# Patient Record
Sex: FEMALE | Race: WHITE
[De-identification: names, ages, dates, MRNs, and addresses within clinical notes are randomized per-mention and may not be internally consistent; named-entity substitution may affect disease eponyms.]

---

## 2017-03-29 ENCOUNTER — HOSPITAL ENCOUNTER (EMERGENCY)
Dept: HOSPITAL 8 - ED | Age: 22
Discharge: HOME | End: 2017-03-29
Payer: COMMERCIAL

## 2017-03-29 VITALS — SYSTOLIC BLOOD PRESSURE: 114 MMHG | DIASTOLIC BLOOD PRESSURE: 67 MMHG

## 2017-03-29 VITALS — BODY MASS INDEX: 25.78 KG/M2 | HEIGHT: 63 IN | WEIGHT: 145.51 LBS

## 2017-03-29 DIAGNOSIS — K21.0: Primary | ICD-10-CM

## 2017-03-29 LAB
AST SERPL-CCNC: 22 U/L (ref 15–37)
BUN SERPL-MCNC: 11 MG/DL (ref 7–18)
HCG UR LOT: (no result)
HCG UR OBC: (no result)
HGB BLD-MCNC: 17.1 G/DL (ref 11.7–16.4)
PATH.CAST-FLAG: (no result)
SPERM-FLAG: (no result)
SRC-FLAG: (no result)
XTAL-FLAG: (no result)
YLC-FLAG: (no result)

## 2017-03-29 PROCEDURE — 96375 TX/PRO/DX INJ NEW DRUG ADDON: CPT

## 2017-03-29 PROCEDURE — 87086 URINE CULTURE/COLONY COUNT: CPT

## 2017-03-29 PROCEDURE — 96374 THER/PROPH/DIAG INJ IV PUSH: CPT

## 2017-03-29 PROCEDURE — 80053 COMPREHEN METABOLIC PANEL: CPT

## 2017-03-29 PROCEDURE — 81001 URINALYSIS AUTO W/SCOPE: CPT

## 2017-03-29 PROCEDURE — 36415 COLL VENOUS BLD VENIPUNCTURE: CPT

## 2017-03-29 PROCEDURE — 85025 COMPLETE CBC W/AUTO DIFF WBC: CPT

## 2017-03-29 PROCEDURE — 96361 HYDRATE IV INFUSION ADD-ON: CPT

## 2017-03-29 PROCEDURE — 99285 EMERGENCY DEPT VISIT HI MDM: CPT

## 2017-03-29 PROCEDURE — 76700 US EXAM ABDOM COMPLETE: CPT

## 2017-03-29 PROCEDURE — 81025 URINE PREGNANCY TEST: CPT

## 2017-03-29 PROCEDURE — S0028 INJECTION, FAMOTIDINE, 20 MG: HCPCS

## 2017-03-29 PROCEDURE — 83690 ASSAY OF LIPASE: CPT

## 2017-12-27 ENCOUNTER — HOSPITAL ENCOUNTER (EMERGENCY)
Dept: HOSPITAL 8 - ED | Age: 22
Discharge: HOME | End: 2017-12-27
Payer: COMMERCIAL

## 2017-12-27 VITALS — BODY MASS INDEX: 25.53 KG/M2 | HEIGHT: 65 IN | WEIGHT: 153.22 LBS

## 2017-12-27 VITALS — DIASTOLIC BLOOD PRESSURE: 56 MMHG | SYSTOLIC BLOOD PRESSURE: 104 MMHG

## 2017-12-27 DIAGNOSIS — K25.3: Primary | ICD-10-CM

## 2017-12-27 DIAGNOSIS — B96.81: ICD-10-CM

## 2017-12-27 LAB
ALBUMIN SERPL-MCNC: 4.1 G/DL (ref 3.4–5)
ALP SERPL-CCNC: 92 U/L (ref 45–117)
ALT SERPL-CCNC: 18 U/L (ref 12–78)
ANION GAP SERPL CALC-SCNC: 7 MMOL/L (ref 5–15)
BASOPHILS # BLD AUTO: 0.02 X10^3/UL (ref 0–0.1)
BASOPHILS NFR BLD AUTO: 0 % (ref 0–1)
BILIRUB SERPL-MCNC: 1.1 MG/DL (ref 0.2–1)
CALCIUM SERPL-MCNC: 8.7 MG/DL (ref 8.5–10.1)
CHLORIDE SERPL-SCNC: 107 MMOL/L (ref 98–107)
CREAT SERPL-MCNC: 0.83 MG/DL (ref 0.55–1.02)
CULTURE INDICATED?: NO
EOSINOPHIL # BLD AUTO: 0.03 X10^3/UL (ref 0–0.4)
EOSINOPHIL NFR BLD AUTO: 0 % (ref 1–7)
ERYTHROCYTE [DISTWIDTH] IN BLOOD BY AUTOMATED COUNT: 13.8 % (ref 9.6–15.2)
LYMPHOCYTES # BLD AUTO: 1.1 X10^3/UL (ref 1–3.4)
LYMPHOCYTES NFR BLD AUTO: 11 % (ref 22–44)
MCH RBC QN AUTO: 30.7 PG (ref 27–34.8)
MCHC RBC AUTO-ENTMCNC: 33.7 G/DL (ref 32.4–35.8)
MCV RBC AUTO: 90.8 FL (ref 80–100)
MD: NO
MICROSCOPIC: (no result)
MONOCYTES # BLD AUTO: 0.21 X10^3/UL (ref 0.2–0.8)
MONOCYTES NFR BLD AUTO: 2 % (ref 2–9)
NEUTROPHILS # BLD AUTO: 8.73 X10^3/UL (ref 1.8–6.8)
NEUTROPHILS NFR BLD AUTO: 87 % (ref 42–75)
PLATELET # BLD AUTO: 407 X10^3/UL (ref 130–400)
PMV BLD AUTO: 7.3 FL (ref 7.4–10.4)
PROT SERPL-MCNC: 8.5 G/DL (ref 6.4–8.2)
RBC # BLD AUTO: 5.15 X10^6/UL (ref 3.82–5.3)

## 2017-12-27 PROCEDURE — 85025 COMPLETE CBC W/AUTO DIFF WBC: CPT

## 2017-12-27 PROCEDURE — 99285 EMERGENCY DEPT VISIT HI MDM: CPT

## 2017-12-27 PROCEDURE — 96360 HYDRATION IV INFUSION INIT: CPT

## 2017-12-27 PROCEDURE — 80053 COMPREHEN METABOLIC PANEL: CPT

## 2017-12-27 PROCEDURE — 86677 HELICOBACTER PYLORI ANTIBODY: CPT

## 2017-12-27 PROCEDURE — 76700 US EXAM ABDOM COMPLETE: CPT

## 2017-12-27 PROCEDURE — 81003 URINALYSIS AUTO W/O SCOPE: CPT

## 2017-12-27 PROCEDURE — 84703 CHORIONIC GONADOTROPIN ASSAY: CPT

## 2017-12-27 PROCEDURE — 83690 ASSAY OF LIPASE: CPT

## 2017-12-27 PROCEDURE — 36415 COLL VENOUS BLD VENIPUNCTURE: CPT

## 2018-10-25 ENCOUNTER — HOSPITAL ENCOUNTER (EMERGENCY)
Dept: HOSPITAL 8 - ED | Age: 23
Discharge: HOME | End: 2018-10-25
Payer: MEDICAID

## 2018-10-25 VITALS — HEIGHT: 68 IN | BODY MASS INDEX: 22.95 KG/M2 | WEIGHT: 151.46 LBS

## 2018-10-25 VITALS — SYSTOLIC BLOOD PRESSURE: 115 MMHG | DIASTOLIC BLOOD PRESSURE: 66 MMHG

## 2018-10-25 DIAGNOSIS — K29.00: Primary | ICD-10-CM

## 2018-10-25 LAB
ALBUMIN SERPL-MCNC: 4 G/DL (ref 3.4–5)
ALP SERPL-CCNC: 93 U/L (ref 45–117)
ALT SERPL-CCNC: 25 U/L (ref 12–78)
ANION GAP SERPL CALC-SCNC: 9 MMOL/L (ref 5–15)
BASOPHILS # BLD AUTO: 0 X10^3/UL (ref 0–0.1)
BASOPHILS NFR BLD AUTO: 0 % (ref 0–1)
BILIRUB SERPL-MCNC: 1.5 MG/DL (ref 0.2–1)
CALCIUM SERPL-MCNC: 9 MG/DL (ref 8.5–10.1)
CHLORIDE SERPL-SCNC: 107 MMOL/L (ref 98–107)
CREAT SERPL-MCNC: 0.8 MG/DL (ref 0.55–1.02)
EOSINOPHIL # BLD AUTO: 0.01 X10^3/UL (ref 0–0.4)
EOSINOPHIL NFR BLD AUTO: 0 % (ref 1–7)
ERYTHROCYTE [DISTWIDTH] IN BLOOD BY AUTOMATED COUNT: 13.8 % (ref 9.6–15.2)
LYMPHOCYTES # BLD AUTO: 0.85 X10^3/UL (ref 1–3.4)
LYMPHOCYTES NFR BLD AUTO: 8 % (ref 22–44)
MCH RBC QN AUTO: 31.1 PG (ref 27–34.8)
MCHC RBC AUTO-ENTMCNC: 34.4 G/DL (ref 32.4–35.8)
MCV RBC AUTO: 90.5 FL (ref 80–100)
MD: NO
MONOCYTES # BLD AUTO: 0.34 X10^3/UL (ref 0.2–0.8)
MONOCYTES NFR BLD AUTO: 3 % (ref 2–9)
NEUTROPHILS # BLD AUTO: 9.05 X10^3/UL (ref 1.8–6.8)
NEUTROPHILS NFR BLD AUTO: 88 % (ref 42–75)
PLATELET # BLD AUTO: 387 X10^3/UL (ref 130–400)
PMV BLD AUTO: 7.6 FL (ref 7.4–10.4)
PROT SERPL-MCNC: 8.6 G/DL (ref 6.4–8.2)
RBC # BLD AUTO: 4.92 X10^6/UL (ref 3.82–5.3)

## 2018-10-25 PROCEDURE — 83605 ASSAY OF LACTIC ACID: CPT

## 2018-10-25 PROCEDURE — S0028 INJECTION, FAMOTIDINE, 20 MG: HCPCS

## 2018-10-25 PROCEDURE — 96375 TX/PRO/DX INJ NEW DRUG ADDON: CPT

## 2018-10-25 PROCEDURE — 36415 COLL VENOUS BLD VENIPUNCTURE: CPT

## 2018-10-25 PROCEDURE — 83690 ASSAY OF LIPASE: CPT

## 2018-10-25 PROCEDURE — 96374 THER/PROPH/DIAG INJ IV PUSH: CPT

## 2018-10-25 PROCEDURE — 99285 EMERGENCY DEPT VISIT HI MDM: CPT

## 2018-10-25 PROCEDURE — 96361 HYDRATE IV INFUSION ADD-ON: CPT

## 2018-10-25 PROCEDURE — 80053 COMPREHEN METABOLIC PANEL: CPT

## 2018-10-25 PROCEDURE — 85025 COMPLETE CBC W/AUTO DIFF WBC: CPT

## 2021-01-21 ENCOUNTER — TELEPHONE (OUTPATIENT)
Dept: SCHEDULING | Facility: IMAGING CENTER | Age: 26
End: 2021-01-21

## 2021-02-22 SDOH — ECONOMIC STABILITY: INCOME INSECURITY: IN THE LAST 12 MONTHS, WAS THERE A TIME WHEN YOU WERE NOT ABLE TO PAY THE MORTGAGE OR RENT ON TIME?: YES

## 2021-02-22 SDOH — ECONOMIC STABILITY: TRANSPORTATION INSECURITY
IN THE PAST 12 MONTHS, HAS THE LACK OF TRANSPORTATION KEPT YOU FROM MEDICAL APPOINTMENTS OR FROM GETTING MEDICATIONS?: NO

## 2021-02-22 SDOH — ECONOMIC STABILITY: HOUSING INSECURITY: IN THE LAST 12 MONTHS, HOW MANY PLACES HAVE YOU LIVED?: 1

## 2021-02-22 SDOH — ECONOMIC STABILITY: HOUSING INSECURITY
IN THE LAST 12 MONTHS, WAS THERE A TIME WHEN YOU DID NOT HAVE A STEADY PLACE TO SLEEP OR SLEPT IN A SHELTER (INCLUDING NOW)?: NO

## 2021-02-22 SDOH — HEALTH STABILITY: PHYSICAL HEALTH: ON AVERAGE, HOW MANY MINUTES DO YOU ENGAGE IN EXERCISE AT THIS LEVEL?: 30 MINUTES

## 2021-02-22 SDOH — HEALTH STABILITY: PHYSICAL HEALTH: ON AVERAGE, HOW MANY DAYS PER WEEK DO YOU ENGAGE IN MODERATE TO STRENUOUS EXERCISE (LIKE A BRISK WALK)?: 2 DAYS

## 2021-02-22 SDOH — HEALTH STABILITY: MENTAL HEALTH
STRESS IS WHEN SOMEONE FEELS TENSE, NERVOUS, ANXIOUS, OR CAN'T SLEEP AT NIGHT BECAUSE THEIR MIND IS TROUBLED. HOW STRESSED ARE YOU?: RATHER MUCH

## 2021-02-22 SDOH — ECONOMIC STABILITY: HOUSING INSECURITY
IN THE LAST 12 MONTHS, WAS THERE A TIME WHEN YOU DID NOT HAVE A STEADY PLACE TO SLEEP OR SLEPT IN A SHELTER (INCLUDING NOW)?: YES

## 2021-02-22 SDOH — ECONOMIC STABILITY: TRANSPORTATION INSECURITY
IN THE PAST 12 MONTHS, HAS LACK OF RELIABLE TRANSPORTATION KEPT YOU FROM MEDICAL APPOINTMENTS, MEETINGS, WORK OR FROM GETTING THINGS NEEDED FOR DAILY LIVING?: NO

## 2021-02-22 ASSESSMENT — SOCIAL DETERMINANTS OF HEALTH (SDOH)
HOW OFTEN DO YOU HAVE SIX OR MORE DRINKS ON ONE OCCASION: NEVER
HOW HARD IS IT FOR YOU TO PAY FOR THE VERY BASICS LIKE FOOD, HOUSING, MEDICAL CARE, AND HEATING?: NOT VERY HARD
DO YOU BELONG TO ANY CLUBS OR ORGANIZATIONS SUCH AS CHURCH GROUPS UNIONS, FRATERNAL OR ATHLETIC GROUPS, OR SCHOOL GROUPS?: NO
HOW OFTEN DO YOU HAVE A DRINK CONTAINING ALCOHOL: 2-4 TIMES A MONTH
HOW MANY DRINKS CONTAINING ALCOHOL DO YOU HAVE ON A TYPICAL DAY WHEN YOU ARE DRINKING: 1 OR 2
HOW OFTEN DO YOU ATTENT MEETINGS OF THE CLUB OR ORGANIZATION YOU BELONG TO?: NEVER
IN A TYPICAL WEEK, HOW MANY TIMES DO YOU TALK ON THE PHONE WITH FAMILY, FRIENDS, OR NEIGHBORS?: THREE TIMES A WEEK
WITHIN THE PAST 12 MONTHS, YOU WORRIED THAT YOUR FOOD WOULD RUN OUT BEFORE YOU GOT THE MONEY TO BUY MORE: NEVER TRUE
HOW OFTEN DO YOU ATTEND CHURCH OR RELIGIOUS SERVICES?: NEVER
HOW OFTEN DO YOU GET TOGETHER WITH FRIENDS OR RELATIVES?: ONCE A WEEK
WITHIN THE PAST 12 MONTHS, THE FOOD YOU BOUGHT JUST DIDN'T LAST AND YOU DIDN'T HAVE MONEY TO GET MORE: NEVER TRUE

## 2021-02-26 ENCOUNTER — OFFICE VISIT (OUTPATIENT)
Dept: MEDICAL GROUP | Facility: MEDICAL CENTER | Age: 26
End: 2021-02-26
Payer: COMMERCIAL

## 2021-02-26 VITALS
BODY MASS INDEX: 26.69 KG/M2 | TEMPERATURE: 98.3 F | SYSTOLIC BLOOD PRESSURE: 108 MMHG | DIASTOLIC BLOOD PRESSURE: 60 MMHG | WEIGHT: 160.2 LBS | HEART RATE: 88 BPM | HEIGHT: 65 IN | OXYGEN SATURATION: 96 %

## 2021-02-26 DIAGNOSIS — Z13.6 SCREENING FOR CARDIOVASCULAR CONDITION: ICD-10-CM

## 2021-02-26 DIAGNOSIS — R53.82 CHRONIC FATIGUE: ICD-10-CM

## 2021-02-26 DIAGNOSIS — J45.990 EXERCISE-INDUCED ASTHMA: ICD-10-CM

## 2021-02-26 DIAGNOSIS — Z86.39 HISTORY OF IRON DEFICIENCY: ICD-10-CM

## 2021-02-26 DIAGNOSIS — G89.29 CHRONIC JOINT PAIN: ICD-10-CM

## 2021-02-26 DIAGNOSIS — F41.9 ANXIETY: ICD-10-CM

## 2021-02-26 DIAGNOSIS — N94.6 DYSMENORRHEA: ICD-10-CM

## 2021-02-26 DIAGNOSIS — M79.601 PAIN OF RIGHT UPPER EXTREMITY: ICD-10-CM

## 2021-02-26 DIAGNOSIS — M25.50 CHRONIC JOINT PAIN: ICD-10-CM

## 2021-02-26 DIAGNOSIS — R10.13 RECURRENT EPIGASTRIC ABDOMINAL PAIN: ICD-10-CM

## 2021-02-26 DIAGNOSIS — Z00.00 WELLNESS EXAMINATION: ICD-10-CM

## 2021-02-26 PROCEDURE — 99203 OFFICE O/P NEW LOW 30 MIN: CPT | Performed by: PHYSICIAN ASSISTANT

## 2021-02-26 RX ORDER — ALBUTEROL SULFATE 90 UG/1
2 AEROSOL, METERED RESPIRATORY (INHALATION) EVERY 4 HOURS PRN
Qty: 1 EACH | Refills: 3 | Status: SHIPPED | OUTPATIENT
Start: 2021-02-26 | End: 2021-03-03

## 2021-02-26 ASSESSMENT — ANXIETY QUESTIONNAIRES
3. WORRYING TOO MUCH ABOUT DIFFERENT THINGS: SEVERAL DAYS
GAD7 TOTAL SCORE: 8
2. NOT BEING ABLE TO STOP OR CONTROL WORRYING: SEVERAL DAYS
1. FEELING NERVOUS, ANXIOUS, OR ON EDGE: SEVERAL DAYS
5. BEING SO RESTLESS THAT IT IS HARD TO SIT STILL: SEVERAL DAYS
6. BECOMING EASILY ANNOYED OR IRRITABLE: MORE THAN HALF THE DAYS
4. TROUBLE RELAXING: SEVERAL DAYS
7. FEELING AFRAID AS IF SOMETHING AWFUL MIGHT HAPPEN: SEVERAL DAYS

## 2021-02-26 ASSESSMENT — PATIENT HEALTH QUESTIONNAIRE - PHQ9: CLINICAL INTERPRETATION OF PHQ2 SCORE: 0

## 2021-02-26 NOTE — ASSESSMENT & PLAN NOTE
When she is around a lot of people she gets very anxious. Will get really irritable or really emotional and feel like crying. Will get shaking and feel tense. About a year. Sometimes worse than others. No specific inciting event. She was trying to work full time and go to school full time - maybe that was a cause. Sometimes will even feel this at home, getting ready for work. 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 Doesn't really happen at work. May have had some post partum depression. Maybe some anxiety issues even in high school.  Depression Screening    Little interest or pleasure in doing things?  0 - not at all  Feeling down, depressed , or hopeless? 0 - not at all  Patient Health Questionnaire Score: 0    If depressive symptoms identified deferred to follow up visit unless specifically addressed in assesment and plan.      Interpretation of PHQ-9 Total Score   Score Severity   1-4 Minimal Depression   5-9 Mild Depression   10-14 Moderate Depression   15-19 Moderately Severe Depression   20-27 Severe Depression  GUI-7 Questionnaire    Feeling nervous, anxious, or on edge: Several days  Not being able to sop or control worrying: Several days  Worrying too much about different things: Several days  Trouble relaxing: Several days  Being so restless that it's hard to sit still: Several days  Becoming easily annoyed or irritable: More than half the days  Feeling afraid as if something awful might happen: Several days  Total: 8    Interpretation of GUI 7 Total Score   Score Severity :  0-4 No Anxiety   5-9 Mild Anxiety  10-14 Moderate Anxiety  15-21 Severe Anxiety

## 2021-02-26 NOTE — PROGRESS NOTES
"Chief Complaint   Patient presents with   • Arm Pain     (R) off & on x 3 times in the past year   • Anxiety     with crowds   • Menstrual Problem     Get severe hip & leg pain, during       HISTORY OF THE PRESENT ILLNESS: This is a 25 y.o. female new patient to our practice. This pleasant patient is here today to establish care. Works as  at an elementary school. Single, one son in . Non-smoker. On no regular daily meds. Due for pap. Due for labs. Declines vaccines.    Exercise-induced asthma  Albuterol prn, especially exercise in cold air    Dysmenorrhea  During periods. bilat hip pain but left more than right, radiates down to right knee, cramping and sharp, feel like the bone is going to shatter. Usually couple of days before period and couple of days after period. Comes and goes. Worse in the winter. Started after she had her son. Takes ibuprofen, that will allow her to sleep, comes back the next day. Did get an epidural with son, not sure if that is related.     Does get a lot of cramping and bloating with periods. Not really heavy. No difficulty getting pregnant with son. Hasn't tried to get pregnant since then.       Chronic joint pain  Right elbow, sharp shooting pain, starts mid day and then radiates up to shoulder (tender to palpation). 3 times in the last year. All three times very bad at night and then better the next day. Since she was little she had \"knee caps\" off to the side. Gets knee pains, hip pains, elbow pain, neck pains. Right thumb joint. The right thumb with get hot, not necessarily red or very swollen, tender to palpation. Paternal aunt has lupus.     Recurrent epigastric abdominal pain  About 3 times a year. Radiates up and down. Epigastric. Maybe related to food or stress. No meds taken for it.     Anxiety  When she is around a lot of people she gets very anxious. Will get really irritable or really emotional and feel like crying. Will get shaking and feel tense. " About a year. Sometimes worse than others. No specific inciting event. She was trying to work full time and go to school full time - maybe that was a cause. Sometimes will even feel this at home, getting ready for work. 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 Doesn't really happen at work. May have had some post partum depression. Maybe some anxiety issues even in high school.  Depression Screening    Little interest or pleasure in doing things?  0 - not at all  Feeling down, depressed , or hopeless? 0 - not at all  Patient Health Questionnaire Score: 0    If depressive symptoms identified deferred to follow up visit unless specifically addressed in assesment and plan.      Interpretation of PHQ-9 Total Score   Score Severity   1-4 Minimal Depression   5-9 Mild Depression   10-14 Moderate Depression   15-19 Moderately Severe Depression   20-27 Severe Depression  GUI-7 Questionnaire    Feeling nervous, anxious, or on edge: Several days  Not being able to sop or control worrying: Several days  Worrying too much about different things: Several days  Trouble relaxing: Several days  Being so restless that it's hard to sit still: Several days  Becoming easily annoyed or irritable: More than half the days  Feeling afraid as if something awful might happen: Several days  Total: 8    Interpretation of GUI 7 Total Score   Score Severity :  0-4 No Anxiety   5-9 Mild Anxiety  10-14 Moderate Anxiety  15-21 Severe Anxiety      Chronic fatigue  Feels chronically tired, sleeps well, mom wonders if she needs to take vitamin      Past Medical History:   Diagnosis Date   • Anxiety 2/26/2021   • ASTHMA     exercised induced       History reviewed. No pertinent surgical history.    Family Status   Relation Name Status   • Mo  Alive   • Fa  Alive   • Bro  Alive     Family History   Problem Relation Age of Onset   • Migraines Mother    • Arthritis Mother    • No Known Problems Brother        Social History     Tobacco  "Use   • Smoking status: Never Smoker   • Smokeless tobacco: Never Used   Substance Use Topics   • Alcohol use: Yes     Comment: Socially   • Drug use: No     Comment: CBD, as needed       Allergies: Other environmental, Shellfish-derived products, and Cat hair extract    Current Outpatient Medications Ordered in Epic   Medication Sig Dispense Refill   • albuterol 108 (90 Base) MCG/ACT Aero Soln inhalation aerosol Inhale 2 Puffs every four hours as needed for Shortness of Breath. 1 Each 3   • albuterol (PROVENTIL) 90 MCG/ACT AERS Inhale 2 Puffs by mouth every 6 hours as needed for Shortness of Breath. 1 Inhaler 0     No current Epic-ordered facility-administered medications on file.       Review of Systems   Constitutional: Negative for fever, chills, weight loss and malaise/fatigue.   HENT: Negative for ear pain, nosebleeds, congestion, sore throat and neck pain.    Eyes: Negative for blurred vision.   Respiratory: Negative for cough, sputum production, shortness of breath and wheezing.    Cardiovascular: Negative for chest pain, palpitations, orthopnea and leg swelling.   Gastrointestinal: Negative for heartburn, nausea, vomiting and abdominal pain.   Genitourinary: Negative for dysuria, urgency and frequency.   Skin: Negative for rash and itching.   Neurological: Negative for dizziness, tingling, tremors, sensory change, focal weakness and headaches.   Endo/Heme/Allergies: Does not bruise/bleed easily.   All other systems reviewed and are negative except as in HPI.    Exam: /60 (BP Location: Left arm, Patient Position: Sitting, BP Cuff Size: Adult)   Pulse 88   Temp 36.8 °C (98.3 °F) (Temporal)   Ht 1.645 m (5' 4.75\")   Wt 72.7 kg (160 lb 3.2 oz)   SpO2 96%   General: Normal appearing. No distress.  Neck: Supple without JVD or bruit. Thyroid is not enlarged.  Pulmonary: Clear to ausculation.  Normal effort. No rales, ronchi, or wheezing.  Cardiovascular: Regular rate and rhythm without murmur. Carotid " and radial pulses are intact and equal bilaterally.  Neurologic: Grossly nonfocal  Skin: Warm and dry.  No obvious lesions.  Musculoskeletal: Normal gait. No extremity cyanosis, clubbing, or edema.  Psych: Normal mood and affect. Alert and oriented x3. Judgment and insight is normal.      Assessment/Plan  1. Anxiety  REFERRAL TO BEHAVIORAL HEALTH   2. Dysmenorrhea     3. Pain of right upper extremity     4. Exercise-induced asthma  albuterol 108 (90 Base) MCG/ACT Aero Soln inhalation aerosol   5. Chronic joint pain  Sed Rate    RHEUMATOID ARTHRITIS FACTOR    ANTI-NUCLEAR ANTIBODY SERUM    MPO/WY-3 (ANCA) ABS   6. Recurrent epigastric abdominal pain     7. Chronic fatigue     8. Wellness examination  TSH WITH REFLEX TO FT4    Comp Metabolic Panel    CBC WITH DIFFERENTIAL   9. Screening for cardiovascular condition  Lipid Profile   10. History of iron deficiency  IRON/TOTAL IRON BIND     F/u after labs, will schedule pap

## 2021-02-26 NOTE — ASSESSMENT & PLAN NOTE
"Right elbow, sharp shooting pain, starts mid day and then radiates up to shoulder (tender to palpation). 3 times in the last year. All three times very bad at night and then better the next day. Since she was little she had \"knee caps\" off to the side. Gets knee pains, hip pains, elbow pain, neck pains. Right thumb joint. The right thumb with get hot, not necessarily red or very swollen, tender to palpation. Paternal aunt has lupus.   "

## 2021-02-26 NOTE — ASSESSMENT & PLAN NOTE
About 3 times a year. Radiates up and down. Epigastric. Maybe related to food or stress. No meds taken for it.

## 2021-02-26 NOTE — ASSESSMENT & PLAN NOTE
During periods. bilat hip pain but left more than right, radiates down to right knee, cramping and sharp, feel like the bone is going to shatter. Usually couple of days before period and couple of days after period. Comes and goes. Worse in the winter. Started after she had her son. Takes ibuprofen, that will allow her to sleep, comes back the next day. Did get an epidural with son, not sure if that is related.     Does get a lot of cramping and bloating with periods. Not really heavy. No difficulty getting pregnant with son. Hasn't tried to get pregnant since then.

## 2021-02-27 ENCOUNTER — HOSPITAL ENCOUNTER (OUTPATIENT)
Dept: LAB | Facility: MEDICAL CENTER | Age: 26
End: 2021-02-27
Attending: PHYSICIAN ASSISTANT
Payer: COMMERCIAL

## 2021-02-27 DIAGNOSIS — Z00.00 WELLNESS EXAMINATION: ICD-10-CM

## 2021-02-27 DIAGNOSIS — M25.50 CHRONIC JOINT PAIN: ICD-10-CM

## 2021-02-27 DIAGNOSIS — Z13.6 SCREENING FOR CARDIOVASCULAR CONDITION: ICD-10-CM

## 2021-02-27 DIAGNOSIS — Z86.39 HISTORY OF IRON DEFICIENCY: ICD-10-CM

## 2021-02-27 DIAGNOSIS — G89.29 CHRONIC JOINT PAIN: ICD-10-CM

## 2021-02-27 LAB
ALBUMIN SERPL BCP-MCNC: 4.2 G/DL (ref 3.2–4.9)
ALBUMIN/GLOB SERPL: 1.2 G/DL
ALP SERPL-CCNC: 99 U/L (ref 30–99)
ALT SERPL-CCNC: 18 U/L (ref 2–50)
ANION GAP SERPL CALC-SCNC: 10 MMOL/L (ref 7–16)
AST SERPL-CCNC: 16 U/L (ref 12–45)
BASOPHILS # BLD AUTO: 0.7 % (ref 0–1.8)
BASOPHILS # BLD: 0.04 K/UL (ref 0–0.12)
BILIRUB SERPL-MCNC: 0.4 MG/DL (ref 0.1–1.5)
BUN SERPL-MCNC: 7 MG/DL (ref 8–22)
CALCIUM SERPL-MCNC: 9.5 MG/DL (ref 8.5–10.5)
CHLORIDE SERPL-SCNC: 103 MMOL/L (ref 96–112)
CHOLEST SERPL-MCNC: 158 MG/DL (ref 100–199)
CO2 SERPL-SCNC: 24 MMOL/L (ref 20–33)
CREAT SERPL-MCNC: 0.7 MG/DL (ref 0.5–1.4)
EOSINOPHIL # BLD AUTO: 0.02 K/UL (ref 0–0.51)
EOSINOPHIL NFR BLD: 0.3 % (ref 0–6.9)
ERYTHROCYTE [DISTWIDTH] IN BLOOD BY AUTOMATED COUNT: 45.1 FL (ref 35.9–50)
ERYTHROCYTE [SEDIMENTATION RATE] IN BLOOD BY WESTERGREN METHOD: 21 MM/HOUR (ref 0–20)
GLOBULIN SER CALC-MCNC: 3.6 G/DL (ref 1.9–3.5)
GLUCOSE SERPL-MCNC: 86 MG/DL (ref 65–99)
HCT VFR BLD AUTO: 43.4 % (ref 37–47)
HDLC SERPL-MCNC: 43 MG/DL
HGB BLD-MCNC: 14.2 G/DL (ref 12–16)
IMM GRANULOCYTES # BLD AUTO: 0.01 K/UL (ref 0–0.11)
IMM GRANULOCYTES NFR BLD AUTO: 0.2 % (ref 0–0.9)
IRON SATN MFR SERPL: 11 % (ref 15–55)
IRON SERPL-MCNC: 40 UG/DL (ref 40–170)
LDLC SERPL CALC-MCNC: 100 MG/DL
LYMPHOCYTES # BLD AUTO: 2.44 K/UL (ref 1–4.8)
LYMPHOCYTES NFR BLD: 41.9 % (ref 22–41)
MCH RBC QN AUTO: 29.7 PG (ref 27–33)
MCHC RBC AUTO-ENTMCNC: 32.7 G/DL (ref 33.6–35)
MCV RBC AUTO: 90.8 FL (ref 81.4–97.8)
MONOCYTES # BLD AUTO: 0.42 K/UL (ref 0–0.85)
MONOCYTES NFR BLD AUTO: 7.2 % (ref 0–13.4)
NEUTROPHILS # BLD AUTO: 2.89 K/UL (ref 2–7.15)
NEUTROPHILS NFR BLD: 49.7 % (ref 44–72)
NRBC # BLD AUTO: 0 K/UL
NRBC BLD-RTO: 0 /100 WBC
PLATELET # BLD AUTO: 431 K/UL (ref 164–446)
PMV BLD AUTO: 9.2 FL (ref 9–12.9)
POTASSIUM SERPL-SCNC: 4.2 MMOL/L (ref 3.6–5.5)
PROT SERPL-MCNC: 7.8 G/DL (ref 6–8.2)
RBC # BLD AUTO: 4.78 M/UL (ref 4.2–5.4)
RHEUMATOID FACT SER IA-ACNC: <10 IU/ML (ref 0–14)
SODIUM SERPL-SCNC: 137 MMOL/L (ref 135–145)
TIBC SERPL-MCNC: 370 UG/DL (ref 250–450)
TRIGL SERPL-MCNC: 76 MG/DL (ref 0–149)
TSH SERPL DL<=0.005 MIU/L-ACNC: 0.94 UIU/ML (ref 0.38–5.33)
UIBC SERPL-MCNC: 330 UG/DL (ref 110–370)
WBC # BLD AUTO: 5.8 K/UL (ref 4.8–10.8)

## 2021-02-27 PROCEDURE — 80053 COMPREHEN METABOLIC PANEL: CPT

## 2021-02-27 PROCEDURE — 86431 RHEUMATOID FACTOR QUANT: CPT

## 2021-02-27 PROCEDURE — 85025 COMPLETE CBC W/AUTO DIFF WBC: CPT

## 2021-02-27 PROCEDURE — 84443 ASSAY THYROID STIM HORMONE: CPT

## 2021-02-27 PROCEDURE — 86039 ANTINUCLEAR ANTIBODIES (ANA): CPT

## 2021-02-27 PROCEDURE — 83516 IMMUNOASSAY NONANTIBODY: CPT

## 2021-02-27 PROCEDURE — 36415 COLL VENOUS BLD VENIPUNCTURE: CPT

## 2021-02-27 PROCEDURE — 83550 IRON BINDING TEST: CPT

## 2021-02-27 PROCEDURE — 83540 ASSAY OF IRON: CPT

## 2021-02-27 PROCEDURE — 85652 RBC SED RATE AUTOMATED: CPT

## 2021-02-27 PROCEDURE — 86038 ANTINUCLEAR ANTIBODIES: CPT

## 2021-02-27 PROCEDURE — 80061 LIPID PANEL: CPT

## 2021-03-02 LAB
MYELOPEROXIDASE AB SER-ACNC: 1 AU/ML (ref 0–19)
NUCLEAR IGG SER QL IA: DETECTED
PROTEINASE3 AB SER-ACNC: 2 AU/ML (ref 0–19)

## 2021-03-03 ENCOUNTER — OFFICE VISIT (OUTPATIENT)
Dept: MEDICAL GROUP | Facility: MEDICAL CENTER | Age: 26
End: 2021-03-03
Payer: COMMERCIAL

## 2021-03-03 VITALS
BODY MASS INDEX: 28.22 KG/M2 | DIASTOLIC BLOOD PRESSURE: 62 MMHG | SYSTOLIC BLOOD PRESSURE: 128 MMHG | HEIGHT: 65 IN | OXYGEN SATURATION: 95 % | TEMPERATURE: 98.4 F | HEART RATE: 81 BPM | WEIGHT: 169.4 LBS

## 2021-03-03 DIAGNOSIS — R53.82 CHRONIC FATIGUE: ICD-10-CM

## 2021-03-03 DIAGNOSIS — M25.50 CHRONIC JOINT PAIN: ICD-10-CM

## 2021-03-03 DIAGNOSIS — F41.9 ANXIETY: ICD-10-CM

## 2021-03-03 DIAGNOSIS — G89.29 CHRONIC JOINT PAIN: ICD-10-CM

## 2021-03-03 DIAGNOSIS — M79.10 MUSCLE PAIN: ICD-10-CM

## 2021-03-03 LAB
ANA INTERPRETIVE COMMENT Q5143: NORMAL
ANTINUCLEAR ANTIBODY (ANA), HEP-2, IGG Q5142: NORMAL

## 2021-03-03 PROCEDURE — 99213 OFFICE O/P EST LOW 20 MIN: CPT | Performed by: PHYSICIAN ASSISTANT

## 2021-03-03 ASSESSMENT — FIBROSIS 4 INDEX: FIB4 SCORE: 0.23

## 2021-03-04 NOTE — PROGRESS NOTES
Subjective:   Gm Riley is a 25 y.o. female here today for follow up after labs    Chronic joint pain  Labs wnl, consider retesting in one year    Chronic fatigue  Tsh, cbc wnl    Anxiety  Referral placed to therapy, patient has number to call and schedule       Current medicines (including changes today)  Current Outpatient Medications   Medication Sig Dispense Refill   • albuterol (PROVENTIL) 90 MCG/ACT AERS Inhale 2 Puffs by mouth every 6 hours as needed for Shortness of Breath. 1 Inhaler 0     No current facility-administered medications for this visit.     She  has a past medical history of Anxiety (2/26/2021) and ASTHMA. She also has no past medical history of Addisons disease ([facility]), Adrenal disorder ([facility]), Allergy, unspecified not elsewhere classified, Anemia, Arrhythmia, Arthritis, Asymptomatic human immunodeficiency virus (HIV) infection status ([facility]), Blood transfusion, without reported diagnosis, Cancer ([facility]), CHF (congestive heart failure) ([facility]), Chronic airway obstruction, not elsewhere classified, Clotting disorder ([facility]), Cushings syndrome ([facility]), Depression, Diabetes, Diabetic neuropathy ([facility]), Emphysema, GERD (gastroesophageal reflux disease), Glaucoma, Goiter, Headache(784.0), Headache, classical migraine, Heart attack ([facility]), Heart murmur, Hyperlipidemia, Hypertension, IBD (inflammatory bowel disease), Kidney disease, Meningitis, Muscle disorder, Osteoporosis, unspecified, Parathyroid disorder ([facility]), Pituitary disease ([facility]), Seizure ([facility]), Sickle cell disease ([facility]), Stroke ([facility]), Substance abuse ([facility]), Thyroid disease, Tuberculosis, Type II or unspecified type diabetes mellitus without mention of complication, not stated as uncontrolled, Ulcer, Unspecified cataract, or Urinary tract infection, site not specified.    ROS   No fever/chills. No weight change. No headache/dizziness. No focal weakness. No sore throat, nasal congestion, ear pain. No chest pain, no shortness of breath, difficulty  "breathing. No n/v/d/c or abdominal pain. No urinary complaint. No rash or skin lesion. No current joint or muscle pain or swelling.       Objective:     /62 (BP Location: Right arm, Patient Position: Sitting, BP Cuff Size: Adult)   Pulse 81   Temp 36.9 °C (98.4 °F) (Temporal)   Ht 1.645 m (5' 4.75\")   Wt 76.8 kg (169 lb 6.4 oz)   SpO2 95%  Body mass index is 28.41 kg/m².   Physical Exam:  Constitutional: WDWN, NAD  Skin: Warm, dry, good turgor, no rashes in visible areas.  Psych: Alert and oriented x3, normal affect and mood.    Assessment and Plan:   The following treatment plan was discussed    1. Muscle pain    - Sed Rate; Future  - CREATINE KINASE; Future    2. Chronic joint pain      3. Chronic fatigue      4. Anxiety        Followup: as needed         " no

## 2021-05-04 ENCOUNTER — APPOINTMENT (RX ONLY)
Dept: URBAN - METROPOLITAN AREA CLINIC 22 | Facility: CLINIC | Age: 26
Setting detail: DERMATOLOGY
End: 2021-05-04

## 2021-05-04 DIAGNOSIS — L81.0 POSTINFLAMMATORY HYPERPIGMENTATION: ICD-10-CM

## 2021-05-04 DIAGNOSIS — L70.0 ACNE VULGARIS: ICD-10-CM

## 2021-05-04 PROCEDURE — ? TREATMENT REGIMEN

## 2021-05-04 PROCEDURE — 99203 OFFICE O/P NEW LOW 30 MIN: CPT

## 2021-05-04 PROCEDURE — ? COUNSELING

## 2021-05-04 PROCEDURE — ? PRESCRIPTION

## 2021-05-04 RX ORDER — TRETIONIN 0.5 MG/G
CREAM TOPICAL
Qty: 1 | Refills: 2 | Status: ERX | COMMUNITY
Start: 2021-05-04

## 2021-05-04 RX ORDER — MINOCYCLINE HYDROCHLORIDE 100 MG/1
CAPSULE ORAL
Qty: 60 | Refills: 2 | Status: ERX | COMMUNITY
Start: 2021-05-04

## 2021-05-04 RX ADMIN — TRETIONIN THIN LAYER: 0.5 CREAM TOPICAL at 00:00

## 2021-05-04 RX ADMIN — MINOCYCLINE HYDROCHLORIDE 1: 100 CAPSULE ORAL at 00:00

## 2021-05-04 ASSESSMENT — LOCATION DETAILED DESCRIPTION DERM
LOCATION DETAILED: UPPER STERNUM
LOCATION DETAILED: RIGHT INFERIOR CENTRAL MALAR CHEEK
LOCATION DETAILED: LEFT INFERIOR CENTRAL MALAR CHEEK
LOCATION DETAILED: SUPERIOR THORACIC SPINE
LOCATION DETAILED: LEFT MEDIAL FOREHEAD
LOCATION DETAILED: RIGHT MEDIAL SUPERIOR CHEST
LOCATION DETAILED: RIGHT SUPERIOR LATERAL BUCCAL CHEEK

## 2021-05-04 ASSESSMENT — LOCATION SIMPLE DESCRIPTION DERM
LOCATION SIMPLE: LEFT CHEEK
LOCATION SIMPLE: LEFT FOREHEAD
LOCATION SIMPLE: CHEST
LOCATION SIMPLE: RIGHT CHEEK
LOCATION SIMPLE: UPPER BACK

## 2021-05-04 ASSESSMENT — LOCATION ZONE DERM
LOCATION ZONE: TRUNK
LOCATION ZONE: FACE

## 2021-05-04 NOTE — PROCEDURE: COUNSELING
Topical Clindamycin Pregnancy And Lactation Text: This medication is Pregnancy Category B and is considered safe during pregnancy. It is unknown if it is excreted in breast milk.
Birth Control Pills Counseling: Birth Control Pill Counseling: I discussed with the patient the potential side effects of OCPs including but not limited to increased risk of stroke, heart attack, thrombophlebitis, deep venous thrombosis, hepatic adenomas, breast changes, GI upset, headaches, and depression.  The patient verbalized understanding of the proper use and possible adverse effects of OCPs. All of the patient's questions and concerns were addressed.
Isotretinoin Pregnancy And Lactation Text: This medication is Pregnancy Category X and is considered extremely dangerous during pregnancy. It is unknown if it is excreted in breast milk.
Include Pregnancy/Lactation Warning?: No
Doxycycline Counseling:  Patient counseled regarding possible photosensitivity and increased risk for sunburn.  Patient instructed to avoid sunlight, if possible.  When exposed to sunlight, patients should wear protective clothing, sunglasses, and sunscreen.  The patient was instructed to call the office immediately if the following severe adverse effects occur:  hearing changes, easy bruising/bleeding, severe headache, or vision changes.  The patient verbalized understanding of the proper use and possible adverse effects of doxycycline.  All of the patient's questions and concerns were addressed.
Minocycline Pregnancy And Lactation Text: This medication is Pregnancy Category D and not consider safe during pregnancy. It is also excreted in breast milk.
Doxycycline Pregnancy And Lactation Text: This medication is Pregnancy Category D and not consider safe during pregnancy. It is also excreted in breast milk but is considered safe for shorter treatment courses.
Azithromycin Counseling:  I discussed with the patient the risks of azithromycin including but not limited to GI upset, allergic reaction, drug rash, diarrhea, and yeast infections.
Sarecycline Counseling: Patient advised regarding possible photosensitivity and discoloration of the teeth, skin, lips, tongue and gums.  Patient instructed to avoid sunlight, if possible.  When exposed to sunlight, patients should wear protective clothing, sunglasses, and sunscreen.  The patient was instructed to call the office immediately if the following severe adverse effects occur:  hearing changes, easy bruising/bleeding, severe headache, or vision changes.  The patient verbalized understanding of the proper use and possible adverse effects of sarecycline.  All of the patient's questions and concerns were addressed.
Tetracycline Counseling: Patient counseled regarding possible photosensitivity and increased risk for sunburn.  Patient instructed to avoid sunlight, if possible.  When exposed to sunlight, patients should wear protective clothing, sunglasses, and sunscreen.  The patient was instructed to call the office immediately if the following severe adverse effects occur:  hearing changes, easy bruising/bleeding, severe headache, or vision changes.  The patient verbalized understanding of the proper use and possible adverse effects of tetracycline.  All of the patient's questions and concerns were addressed. Patient understands to avoid pregnancy while on therapy due to potential birth defects.
Birth Control Pills Pregnancy And Lactation Text: This medication should be avoided if pregnant and for the first 30 days post-partum.
Topical Sulfur Applications Counseling: Topical Sulfur Counseling: Patient counseled that this medication may cause skin irritation or allergic reactions.  In the event of skin irritation, the patient was advised to reduce the amount of the drug applied or use it less frequently.   The patient verbalized understanding of the proper use and possible adverse effects of topical sulfur application.  All of the patient's questions and concerns were addressed.
Topical Retinoid Pregnancy And Lactation Text: This medication is Pregnancy Category C. It is unknown if this medication is excreted in breast milk.
Tazorac Counseling:  Patient advised that medication is irritating and drying.  Patient may need to apply sparingly and wash off after an hour before eventually leaving it on overnight.  The patient verbalized understanding of the proper use and possible adverse effects of tazorac.  All of the patient's questions and concerns were addressed.
Erythromycin Counseling:  I discussed with the patient the risks of erythromycin including but not limited to GI upset, allergic reaction, drug rash, diarrhea, increase in liver enzymes, and yeast infections.
Detail Level: Zone
High Dose Vitamin A Counseling: Side effects reviewed, pt to contact office should one occur.
Azithromycin Pregnancy And Lactation Text: This medication is considered safe during pregnancy and is also secreted in breast milk.
Spironolactone Counseling: Patient advised regarding risks of diarrhea, abdominal pain, hyperkalemia, birth defects (for female patients), liver toxicity and renal toxicity. The patient may need blood work to monitor liver and kidney function and potassium levels while on therapy. The patient verbalized understanding of the proper use and possible adverse effects of spironolactone.  All of the patient's questions and concerns were addressed.
High Dose Vitamin A Pregnancy And Lactation Text: High dose vitamin A therapy is contraindicated during pregnancy and breast feeding.
Benzoyl Peroxide Counseling: Patient counseled that medicine may cause skin irritation and bleach clothing.  In the event of skin irritation, the patient was advised to reduce the amount of the drug applied or use it less frequently.   The patient verbalized understanding of the proper use and possible adverse effects of benzoyl peroxide.  All of the patient's questions and concerns were addressed.
Topical Sulfur Applications Pregnancy And Lactation Text: This medication is Pregnancy Category C and has an unknown safety profile during pregnancy. It is unknown if this topical medication is excreted in breast milk.
Dapsone Counseling: I discussed with the patient the risks of dapsone including but not limited to hemolytic anemia, agranulocytosis, rashes, methemoglobinemia, kidney failure, peripheral neuropathy, headaches, GI upset, and liver toxicity.  Patients who start dapsone require monitoring including baseline LFTs and weekly CBCs for the first month, then every month thereafter.  The patient verbalized understanding of the proper use and possible adverse effects of dapsone.  All of the patient's questions and concerns were addressed.
Dapsone Pregnancy And Lactation Text: This medication is Pregnancy Category C and is not considered safe during pregnancy or breast feeding.
Bactrim Counseling:  I discussed with the patient the risks of sulfa antibiotics including but not limited to GI upset, allergic reaction, drug rash, diarrhea, dizziness, photosensitivity, and yeast infections.  Rarely, more serious reactions can occur including but not limited to aplastic anemia, agranulocytosis, methemoglobinemia, blood dyscrasias, liver or kidney failure, lung infiltrates or desquamative/blistering drug rashes.
Erythromycin Pregnancy And Lactation Text: This medication is Pregnancy Category B and is considered safe during pregnancy. It is also excreted in breast milk.
Tazorac Pregnancy And Lactation Text: This medication is not safe during pregnancy. It is unknown if this medication is excreted in breast milk.
Isotretinoin Counseling: Patient should get monthly blood tests, not donate blood, not drive at night if vision affected, not share medication, and not undergo elective surgery for 6 months after tx completed. Side effects reviewed, pt to contact office should one occur.
Minocycline Counseling: Patient advised regarding possible photosensitivity and discoloration of the teeth, skin, lips, tongue and gums.  Patient instructed to avoid sunlight, if possible.  When exposed to sunlight, patients should wear protective clothing, sunglasses, and sunscreen.  The patient was instructed to call the office immediately if the following severe adverse effects occur:  hearing changes, easy bruising/bleeding, severe headache, or vision changes.  The patient verbalized understanding of the proper use and possible adverse effects of minocycline.  All of the patient's questions and concerns were addressed.
Benzoyl Peroxide Pregnancy And Lactation Text: This medication is Pregnancy Category C. It is unknown if benzoyl peroxide is excreted in breast milk.
Topical Retinoid counseling:  Patient advised to apply a pea-sized amount only at bedtime and wait 30 minutes after washing their face before applying.  If too drying, patient may add a non-comedogenic moisturizer. The patient verbalized understanding of the proper use and possible adverse effects of retinoids.  All of the patient's questions and concerns were addressed.
Bactrim Pregnancy And Lactation Text: This medication is Pregnancy Category D and is known to cause fetal risk.  It is also excreted in breast milk.
Spironolactone Pregnancy And Lactation Text: This medication can cause feminization of the male fetus and should be avoided during pregnancy. The active metabolite is also found in breast milk.
Topical Clindamycin Counseling: Patient counseled that this medication may cause skin irritation or allergic reactions.  In the event of skin irritation, the patient was advised to reduce the amount of the drug applied or use it less frequently.   The patient verbalized understanding of the proper use and possible adverse effects of clindamycin.  All of the patient's questions and concerns were addressed.

## 2021-05-04 NOTE — PROCEDURE: TREATMENT REGIMEN
Hide Differin Products: No
Treatment 2: tretinoin 0.05% cream
Sig For Treatment 1 (If Needed): twice daily
Plan: Discussed Spironolactone vs Accutane. Patient will start on oral antibiotics and topical retinoids, will re evaluate at next office visit.
Detail Level: Simple
Action 4: Continue
Action 2: Start
Sig For Treatment 2 (If Needed): three times a week
Treatment 1: minocycline

## 2022-04-18 ENCOUNTER — OFFICE VISIT (OUTPATIENT)
Dept: MEDICAL GROUP | Facility: MEDICAL CENTER | Age: 27
End: 2022-04-18
Payer: COMMERCIAL

## 2022-04-18 VITALS
HEART RATE: 66 BPM | HEIGHT: 65 IN | OXYGEN SATURATION: 97 % | BODY MASS INDEX: 27.79 KG/M2 | RESPIRATION RATE: 16 BRPM | SYSTOLIC BLOOD PRESSURE: 110 MMHG | TEMPERATURE: 97.4 F | WEIGHT: 166.8 LBS | DIASTOLIC BLOOD PRESSURE: 76 MMHG

## 2022-04-18 DIAGNOSIS — H91.91 HEARING LOSS OF RIGHT EAR, UNSPECIFIED HEARING LOSS TYPE: ICD-10-CM

## 2022-04-18 DIAGNOSIS — B35.1 TOENAIL FUNGUS: ICD-10-CM

## 2022-04-18 DIAGNOSIS — R21 RASH: ICD-10-CM

## 2022-04-18 DIAGNOSIS — Z13.6 SCREENING FOR CARDIOVASCULAR CONDITION: ICD-10-CM

## 2022-04-18 DIAGNOSIS — R10.13 RECURRENT EPIGASTRIC ABDOMINAL PAIN: ICD-10-CM

## 2022-04-18 DIAGNOSIS — Z00.00 WELLNESS EXAMINATION: ICD-10-CM

## 2022-04-18 DIAGNOSIS — J45.990 EXERCISE-INDUCED ASTHMA: ICD-10-CM

## 2022-04-18 DIAGNOSIS — N94.6 DYSMENORRHEA: ICD-10-CM

## 2022-04-18 DIAGNOSIS — R42 DIZZINESS: ICD-10-CM

## 2022-04-18 PROBLEM — M79.601 PAIN OF RIGHT UPPER EXTREMITY: Status: RESOLVED | Noted: 2021-02-26 | Resolved: 2022-04-18

## 2022-04-18 PROCEDURE — 99214 OFFICE O/P EST MOD 30 MIN: CPT | Performed by: PHYSICIAN ASSISTANT

## 2022-04-18 RX ORDER — OMEPRAZOLE 40 MG/1
40 CAPSULE, DELAYED RELEASE ORAL DAILY
Qty: 30 CAPSULE | Refills: 0 | Status: SHIPPED | OUTPATIENT
Start: 2022-04-18

## 2022-04-18 RX ORDER — CICLOPIROX 80 MG/ML
SOLUTION TOPICAL
Qty: 6.6 ML | Refills: 1 | Status: SHIPPED | OUTPATIENT
Start: 2022-04-18

## 2022-04-18 ASSESSMENT — FIBROSIS 4 INDEX: FIB4 SCORE: 0.24

## 2022-04-18 ASSESSMENT — PATIENT HEALTH QUESTIONNAIRE - PHQ9: CLINICAL INTERPRETATION OF PHQ2 SCORE: 0

## 2022-04-18 NOTE — ASSESSMENT & PLAN NOTE
Dizziness associated with nausea off and on for 2 months. Specifically in the car and/or when she moves quickly. Like vertigo. Can last couple of seconds. Riding passenger in a car really makes her nauseated. Last vision exam normal. Also getting headaches. Wakes up with a headache every day. 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 Sees a chiropractor for chronic neck stiffness, especially on the right. Also has some hearing loss, this has been multiple years, no prior workup. Did have covid a couple of months ago.

## 2022-04-18 NOTE — LETTER
April 18, 2022        RE: Gm Riley          Patient seen today for office visit. Please excuse from time missed at work.          Thank you for your time,            Bonnie Garcia P.A.-C.

## 2022-04-18 NOTE — PROGRESS NOTES
Subjective:   Gm Riley is a 26 y.o. female here today for new concerns and needs regular annual labs. Taking online college classes, working full time and has a 7 y/o son.    Chief Complaint   Patient presents with   • Requesting Labs   • Foot Problem     In between toes       Dizziness  Dizziness associated with nausea off and on for 2 months. Specifically in the car and/or when she moves quickly. Like vertigo. Can last couple of seconds. Riding passenger in a car really makes her nauseated. Last vision exam normal. Also getting headaches. Wakes up with a headache every day. 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 Sees a chiropractor for chronic neck stiffness, especially on the right. Also has some hearing loss, this has been multiple years, no prior workup    Dysmenorrhea  Still getting regular periods, painful and heavy, not on any birth control, last time she was on birth control it caused really bad mood swings    Recurrent epigastric abdominal pain  Recurrent, sometimes take otc med, willing to try prilosec    Toenail fungus  Right pinky toe with thickened, yellow nail, feels like it is starting on the left pinky toe as well       Current medicines (including changes today)  Current Outpatient Medications   Medication Sig Dispense Refill   • omeprazole (PRILOSEC) 40 MG delayed-release capsule Take 1 Capsule by mouth every day. 30 Capsule 0   • ciclopirox (PENLAC) 8 % solution Apply thin layer to toenail nightly and then remove with alcohol wipe once a week. Use for 4-6 weeks 6.6 mL 1   • albuterol (PROVENTIL) 90 MCG/ACT AERS Inhale 2 Puffs by mouth every 6 hours as needed for Shortness of Breath. 1 Inhaler 0     No current facility-administered medications for this visit.     She  has a past medical history of Anxiety (2/26/2021) and ASTHMA.    She has no past medical history of Addisons disease (HCC), Adrenal disorder (HCC), Allergy, unspecified not elsewhere classified,  "Anemia, Arrhythmia, Arthritis, Asymptomatic human immunodeficiency virus (HIV) infection status ([facility]), Blood transfusion, without reported diagnosis, Cancer ([facility]), CHF (congestive heart failure) ([facility]), Chronic airway obstruction, not elsewhere classified, Clotting disorder ([facility]), Cushings syndrome (HCC), Depression, Diabetes, Diabetic neuropathy ([facility]), Emphysema, GERD (gastroesophageal reflux disease), Glaucoma, Goiter, Headache(784.0), Headache, classical migraine, Heart attack ([facility]), Heart murmur, Hyperlipidemia, Hypertension, IBD (inflammatory bowel disease), Kidney disease, Meningitis, Muscle disorder, Osteoporosis, unspecified, Parathyroid disorder ([facility]), Pituitary disease (HCC), Seizure ([facility]), Sickle cell disease ([facility]), Stroke ([facility]), Substance abuse ([facility]), Thyroid disease, Tuberculosis, Type II or unspecified type diabetes mellitus without mention of complication, not stated as uncontrolled, Ulcer, Unspecified cataract, or Urinary tract infection, site not specified.    ROS   No fever/chills. No weight change. No headache/dizziness. No focal weakness. No sore throat, nasal congestion, ear pain. No chest pain, no shortness of breath, difficulty breathing. No n/v/d/c or abdominal pain. No urinary complaint.  No joint pain or swelling.       Objective:     /76 (BP Location: Left arm, Patient Position: Sitting, BP Cuff Size: Adult long)   Pulse 66   Temp 36.3 °C (97.4 °F) (Temporal)   Resp 16   Ht 1.651 m (5' 5\")   Wt 75.7 kg (166 lb 12.8 oz)   SpO2 97%  Body mass index is 27.76 kg/m².   Constitutional: WDWN, NAD  Skin: Warm, dry, good turgor, no rashes in visible areas.  Eye: Equal, round and reactive, conjunctiva clear, lids normal.  ENMT: Lips without lesions, good dentition, oropharynx clear.  Neck: Trachea midline, no masses, no thyromegaly. No cervical or supraclavicular lymphadenopathy  Respiratory: Unlabored respiratory effort, lungs clear to auscultation, no wheezes, no " nahomi.  Cardiovascular: Normal S1, S2, no murmur, no leg edema.  Psych: Alert and oriented x3, normal affect and mood.      Assessment and Plan:   The following treatment plan was discussed    1. Dizziness    - IRON/TOTAL IRON BIND; Future  - FERRITIN; Future  - VITAMIN B12; Future  - Referral to ENT    2. Dysmenorrhea      3. Rash      4. Hearing loss of right ear, unspecified hearing loss type    - Referral to ENT    5. Toenail fungus    - ciclopirox (PENLAC) 8 % solution; Apply thin layer to toenail nightly and then remove with alcohol wipe once a week. Use for 4-6 weeks  Dispense: 6.6 mL; Refill: 1    6. Recurrent epigastric abdominal pain    - omeprazole (PRILOSEC) 40 MG delayed-release capsule; Take 1 Capsule by mouth every day.  Dispense: 30 Capsule; Refill: 0    7. Wellness examination    - CBC WITH DIFFERENTIAL; Future  - Comp Metabolic Panel; Future  - TSH WITH REFLEX TO FT4; Future    8. Screening for cardiovascular condition  - Lipid Profile; Future      Followup: after labs

## 2022-04-18 NOTE — ASSESSMENT & PLAN NOTE
Right pinky toe with thickened, yellow nail, feels like it is starting on the left pinky toe as well

## 2022-04-18 NOTE — ASSESSMENT & PLAN NOTE
Still getting regular periods, painful and heavy, not on any birth control, last time she was on birth control it caused really bad mood swings

## 2022-04-22 ENCOUNTER — HOSPITAL ENCOUNTER (OUTPATIENT)
Dept: LAB | Facility: MEDICAL CENTER | Age: 27
End: 2022-04-22
Attending: PHYSICIAN ASSISTANT
Payer: COMMERCIAL

## 2022-04-22 DIAGNOSIS — Z00.00 WELLNESS EXAMINATION: ICD-10-CM

## 2022-04-22 DIAGNOSIS — Z13.6 SCREENING FOR CARDIOVASCULAR CONDITION: ICD-10-CM

## 2022-04-22 DIAGNOSIS — R42 DIZZINESS: ICD-10-CM

## 2022-04-22 LAB
ALBUMIN SERPL BCP-MCNC: 4.4 G/DL (ref 3.2–4.9)
ALBUMIN/GLOB SERPL: 1.4 G/DL
ALP SERPL-CCNC: 88 U/L (ref 30–99)
ALT SERPL-CCNC: 17 U/L (ref 2–50)
ANION GAP SERPL CALC-SCNC: 10 MMOL/L (ref 7–16)
AST SERPL-CCNC: 18 U/L (ref 12–45)
BASOPHILS # BLD AUTO: 0.5 % (ref 0–1.8)
BASOPHILS # BLD: 0.03 K/UL (ref 0–0.12)
BILIRUB SERPL-MCNC: 0.6 MG/DL (ref 0.1–1.5)
BUN SERPL-MCNC: 10 MG/DL (ref 8–22)
CALCIUM SERPL-MCNC: 9.3 MG/DL (ref 8.5–10.5)
CHLORIDE SERPL-SCNC: 103 MMOL/L (ref 96–112)
CHOLEST SERPL-MCNC: 177 MG/DL (ref 100–199)
CO2 SERPL-SCNC: 24 MMOL/L (ref 20–33)
CREAT SERPL-MCNC: 0.64 MG/DL (ref 0.5–1.4)
EOSINOPHIL # BLD AUTO: 0.06 K/UL (ref 0–0.51)
EOSINOPHIL NFR BLD: 1 % (ref 0–6.9)
ERYTHROCYTE [DISTWIDTH] IN BLOOD BY AUTOMATED COUNT: 44.5 FL (ref 35.9–50)
FERRITIN SERPL-MCNC: 43.3 NG/ML (ref 10–291)
GFR SERPLBLD CREATININE-BSD FMLA CKD-EPI: 124 ML/MIN/1.73 M 2
GLOBULIN SER CALC-MCNC: 3.2 G/DL (ref 1.9–3.5)
GLUCOSE SERPL-MCNC: 82 MG/DL (ref 65–99)
HCT VFR BLD AUTO: 42.1 % (ref 37–47)
HDLC SERPL-MCNC: 48 MG/DL
HGB BLD-MCNC: 13.8 G/DL (ref 12–16)
IMM GRANULOCYTES # BLD AUTO: 0.02 K/UL (ref 0–0.11)
IMM GRANULOCYTES NFR BLD AUTO: 0.3 % (ref 0–0.9)
IRON SATN MFR SERPL: 25 % (ref 15–55)
IRON SERPL-MCNC: 87 UG/DL (ref 40–170)
LDLC SERPL CALC-MCNC: 102 MG/DL
LYMPHOCYTES # BLD AUTO: 2.49 K/UL (ref 1–4.8)
LYMPHOCYTES NFR BLD: 39.6 % (ref 22–41)
MCH RBC QN AUTO: 29.6 PG (ref 27–33)
MCHC RBC AUTO-ENTMCNC: 32.8 G/DL (ref 33.6–35)
MCV RBC AUTO: 90.3 FL (ref 81.4–97.8)
MONOCYTES # BLD AUTO: 0.4 K/UL (ref 0–0.85)
MONOCYTES NFR BLD AUTO: 6.4 % (ref 0–13.4)
NEUTROPHILS # BLD AUTO: 3.29 K/UL (ref 2–7.15)
NEUTROPHILS NFR BLD: 52.2 % (ref 44–72)
NRBC # BLD AUTO: 0 K/UL
NRBC BLD-RTO: 0 /100 WBC
PLATELET # BLD AUTO: 436 K/UL (ref 164–446)
PMV BLD AUTO: 8.9 FL (ref 9–12.9)
POTASSIUM SERPL-SCNC: 4.2 MMOL/L (ref 3.6–5.5)
PROT SERPL-MCNC: 7.6 G/DL (ref 6–8.2)
RBC # BLD AUTO: 4.66 M/UL (ref 4.2–5.4)
SODIUM SERPL-SCNC: 137 MMOL/L (ref 135–145)
TIBC SERPL-MCNC: 351 UG/DL (ref 250–450)
TRIGL SERPL-MCNC: 135 MG/DL (ref 0–149)
TSH SERPL DL<=0.005 MIU/L-ACNC: 1.2 UIU/ML (ref 0.38–5.33)
UIBC SERPL-MCNC: 264 UG/DL (ref 110–370)
VIT B12 SERPL-MCNC: 461 PG/ML (ref 211–911)
WBC # BLD AUTO: 6.3 K/UL (ref 4.8–10.8)

## 2022-04-22 PROCEDURE — 84443 ASSAY THYROID STIM HORMONE: CPT

## 2022-04-22 PROCEDURE — 82607 VITAMIN B-12: CPT

## 2022-04-22 PROCEDURE — 82728 ASSAY OF FERRITIN: CPT

## 2022-04-22 PROCEDURE — 83550 IRON BINDING TEST: CPT

## 2022-04-22 PROCEDURE — 80053 COMPREHEN METABOLIC PANEL: CPT

## 2022-04-22 PROCEDURE — 80061 LIPID PANEL: CPT

## 2022-04-22 PROCEDURE — 85025 COMPLETE CBC W/AUTO DIFF WBC: CPT

## 2022-04-22 PROCEDURE — 36415 COLL VENOUS BLD VENIPUNCTURE: CPT

## 2022-04-22 PROCEDURE — 83540 ASSAY OF IRON: CPT

## 2024-07-26 ENCOUNTER — OFFICE VISIT (OUTPATIENT)
Dept: URGENT CARE | Facility: PHYSICIAN GROUP | Age: 29
End: 2024-07-26
Payer: COMMERCIAL

## 2024-07-26 VITALS
BODY MASS INDEX: 28.82 KG/M2 | RESPIRATION RATE: 14 BRPM | SYSTOLIC BLOOD PRESSURE: 116 MMHG | DIASTOLIC BLOOD PRESSURE: 62 MMHG | HEIGHT: 65 IN | TEMPERATURE: 98 F | OXYGEN SATURATION: 99 % | HEART RATE: 74 BPM | WEIGHT: 173 LBS

## 2024-07-26 DIAGNOSIS — Z3A.01 LESS THAN 8 WEEKS GESTATION OF PREGNANCY: ICD-10-CM

## 2024-07-26 DIAGNOSIS — R10.31 RLQ ABDOMINAL PAIN: Primary | ICD-10-CM

## 2024-07-26 LAB
APPEARANCE UR: CLEAR
BILIRUB UR STRIP-MCNC: NEGATIVE MG/DL
COLOR UR AUTO: YELLOW
GLUCOSE UR STRIP.AUTO-MCNC: NEGATIVE MG/DL
KETONES UR STRIP.AUTO-MCNC: NEGATIVE MG/DL
LEUKOCYTE ESTERASE UR QL STRIP.AUTO: NEGATIVE
NITRITE UR QL STRIP.AUTO: NEGATIVE
PH UR STRIP.AUTO: 6 [PH] (ref 5–8)
PROT UR QL STRIP: NEGATIVE MG/DL
RBC UR QL AUTO: NORMAL
SP GR UR STRIP.AUTO: 1.01
UROBILINOGEN UR STRIP-MCNC: 0.2 MG/DL

## 2024-07-26 PROCEDURE — 81002 URINALYSIS NONAUTO W/O SCOPE: CPT

## 2024-07-26 PROCEDURE — 99214 OFFICE O/P EST MOD 30 MIN: CPT

## 2024-07-26 PROCEDURE — 3074F SYST BP LT 130 MM HG: CPT

## 2024-07-26 PROCEDURE — 3078F DIAST BP <80 MM HG: CPT

## 2024-07-26 ASSESSMENT — ENCOUNTER SYMPTOMS
CONSTIPATION: 1
ARTHRALGIAS: 0
HEADACHES: 0
FEVER: 0
BELCHING: 0
FLATUS: 0
DIARRHEA: 0
WEIGHT LOSS: 0
HEMATOCHEZIA: 0
ANOREXIA: 0
ABDOMINAL PAIN: 1
NAUSEA: 1
VOMITING: 0
MYALGIAS: 0

## 2024-07-26 ASSESSMENT — CROHNS DISEASE ACTIVITY INDEX (CDAI): CDAI SCORE: 0

## 2024-07-27 ENCOUNTER — TELEPHONE (OUTPATIENT)
Dept: URGENT CARE | Facility: PHYSICIAN GROUP | Age: 29
End: 2024-07-27
Payer: COMMERCIAL

## 2024-07-27 ENCOUNTER — HOSPITAL ENCOUNTER (OUTPATIENT)
Dept: RADIOLOGY | Facility: MEDICAL CENTER | Age: 29
End: 2024-07-27
Payer: COMMERCIAL

## 2024-07-27 DIAGNOSIS — R10.31 RLQ ABDOMINAL PAIN: ICD-10-CM

## 2024-07-27 PROCEDURE — 76817 TRANSVAGINAL US OBSTETRIC: CPT

## 2024-07-28 DIAGNOSIS — Z3A.01 LESS THAN 8 WEEKS GESTATION OF PREGNANCY: ICD-10-CM

## 2024-07-29 ASSESSMENT — ENCOUNTER SYMPTOMS
HEARTBURN: 0
BLOOD IN STOOL: 0
FLANK PAIN: 0

## 2024-08-14 ENCOUNTER — OFFICE VISIT (OUTPATIENT)
Dept: URGENT CARE | Facility: PHYSICIAN GROUP | Age: 29
End: 2024-08-14
Payer: COMMERCIAL

## 2024-08-14 VITALS
HEIGHT: 65 IN | WEIGHT: 176 LBS | HEART RATE: 88 BPM | BODY MASS INDEX: 29.32 KG/M2 | DIASTOLIC BLOOD PRESSURE: 60 MMHG | TEMPERATURE: 97.7 F | SYSTOLIC BLOOD PRESSURE: 98 MMHG | RESPIRATION RATE: 16 BRPM | OXYGEN SATURATION: 98 %

## 2024-08-14 DIAGNOSIS — Z3A.01 7 WEEKS GESTATION OF PREGNANCY: ICD-10-CM

## 2024-08-14 DIAGNOSIS — J98.8 RTI (RESPIRATORY TRACT INFECTION): ICD-10-CM

## 2024-08-14 LAB
FLUAV RNA SPEC QL NAA+PROBE: NEGATIVE
FLUBV RNA SPEC QL NAA+PROBE: NEGATIVE
RSV RNA SPEC QL NAA+PROBE: NEGATIVE
S PYO DNA SPEC NAA+PROBE: NOT DETECTED
SARS-COV-2 RNA RESP QL NAA+PROBE: POSITIVE

## 2024-08-14 PROCEDURE — 87651 STREP A DNA AMP PROBE: CPT | Performed by: FAMILY MEDICINE

## 2024-08-14 PROCEDURE — 3078F DIAST BP <80 MM HG: CPT | Performed by: FAMILY MEDICINE

## 2024-08-14 PROCEDURE — 3074F SYST BP LT 130 MM HG: CPT | Performed by: FAMILY MEDICINE

## 2024-08-14 PROCEDURE — 99213 OFFICE O/P EST LOW 20 MIN: CPT | Performed by: FAMILY MEDICINE

## 2024-08-14 PROCEDURE — 0241U POCT CEPHEID COV-2, FLU A/B, RSV - PCR: CPT | Performed by: FAMILY MEDICINE

## 2024-08-14 ASSESSMENT — ENCOUNTER SYMPTOMS
SORE THROAT: 1
COUGH: 1
SINUS PAIN: 1

## 2024-08-14 NOTE — LETTER
August 14, 2024         Patient: Gm Riley   YOB: 1995   Date of Visit: 8/14/2024           To Whom it May Concern:    Gm Riley was seen in my clinic on 8/14/2024. She may return to work in 2-3 days.    If you have any questions or concerns, please don't hesitate to call.        Sincerely,           Sudheer Escobar M.D.  Electronically Signed

## 2024-08-14 NOTE — PROGRESS NOTES
"Subjective     Gm Riley is a 29 y.o. female who presents with Pharyngitis (X 2 days that has progressed to sinus/head pressure with cough and nasal congestion. Reports she is 7 weeks pregnant. )    This is a  new problem with uncertain prognosis:   This is a very pleasant 29 y.o. who has come to the walk-in clinic today for 3 days with stuffy runny nose sore throat and headache little bit of malaise no bodyaches no fevers but feels some occasional chills.  No nausea vomiting.    Currently around 7 weeks pregnant awaiting to get established with her OB/GYN.          ALLERGIES:  Other environmental, Shellfish-derived products, and Cat hair extract     PMH:  Past Medical History:   Diagnosis Date    Anxiety 2/26/2021    ASTHMA     exercised induced        PSH:  History reviewed. No pertinent surgical history.    MEDS:  No current outpatient medications on file.    ** I have documented what I find to be significant in regards to past medical, social, family and surgical history  in my HPI or under PMH/PSH/FH review section, otherwise it is noncontributory **           HPI    Review of Systems   HENT:  Positive for congestion, sinus pain and sore throat.    Respiratory:  Positive for cough.    All other systems reviewed and are negative.             Objective     BP 98/60 (BP Location: Left arm, Patient Position: Sitting, BP Cuff Size: Adult long)   Pulse 88   Temp 36.5 °C (97.7 °F) (Temporal)   Resp 16   Ht 1.651 m (5' 5\")   Wt 79.8 kg (176 lb)   LMP 06/21/2024 (Exact Date)   SpO2 98%   BMI 29.29 kg/m²      Physical Exam  Vitals and nursing note reviewed.   Constitutional:       General: She is not in acute distress.     Appearance: Normal appearance. She is well-developed.   HENT:      Head: Normocephalic.      Right Ear: Tympanic membrane normal.      Left Ear: Tympanic membrane normal.      Mouth/Throat:      Mouth: Mucous membranes are moist.      Pharynx: Oropharynx is clear. Posterior oropharyngeal " erythema present. No oropharyngeal exudate.   Cardiovascular:      Rate and Rhythm: Normal rate and regular rhythm.   Pulmonary:      Effort: Pulmonary effort is normal. No respiratory distress.      Breath sounds: Normal breath sounds.   Neurological:      Mental Status: She is alert.      Motor: No abnormal muscle tone.   Psychiatric:         Mood and Affect: Mood normal.         Behavior: Behavior normal.                             Assessment & Plan     1. RTI (respiratory tract infection)  POCT GROUP A STREP, PCR    POCT CoV-2, Flu A/B, RSV by PCR      2. 7 weeks gestation of pregnancy            Viral respiratory illness will rule out strep    - Dx, plan & d/c instructions discussed   - OTC Tylenol as needed      Follow up with your regular primary care providers office within a week to keep them updated and informed of this visit and for regular routine health maintenance check-ups. ER if not improving in 2-3 days or if feeling/getting worse. (If you do not have a primary care provider and need to schedule one you may call [facility] at [phone] to do this).    Patient left in stable condition     POCT results reviewed/discussed    Discussed if any testing, labs or imaging studies are obtained outside of the [facility] facility, it is their responsibility to contact the Urgent Care and let us know that it was done and get us the results so adequate follow up can be initiated    Pertinent prior lab work and/or imaging studies in Epic have been reviewed by me today on day of this visit and taken into account for my treatment and plan today    Pertinent PMH/PSH and/or chronic conditions and medications if any were reviewed today and taken into account for my treatment and plan today    Pertinent prior office visit notes in [facility] have been reviewed by me today on day of this visit.    Please note that this dictation may have been created using voice recognition software, if so I have made every reasonable attempt to  correct obvious errors, but I expect that there are errors of grammar and possibly content that I did not discover before finalizing the note.

## 2024-09-15 SDOH — ECONOMIC STABILITY: FOOD INSECURITY: WITHIN THE PAST 12 MONTHS, THE FOOD YOU BOUGHT JUST DIDN'T LAST AND YOU DIDN'T HAVE MONEY TO GET MORE.: NEVER TRUE

## 2024-09-15 SDOH — ECONOMIC STABILITY: FOOD INSECURITY: WITHIN THE PAST 12 MONTHS, YOU WORRIED THAT YOUR FOOD WOULD RUN OUT BEFORE YOU GOT MONEY TO BUY MORE.: NEVER TRUE

## 2024-09-15 SDOH — ECONOMIC STABILITY: TRANSPORTATION INSECURITY
IN THE PAST 12 MONTHS, HAS LACK OF TRANSPORTATION KEPT YOU FROM MEETINGS, WORK, OR FROM GETTING THINGS NEEDED FOR DAILY LIVING?: NO

## 2024-09-15 SDOH — ECONOMIC STABILITY: INCOME INSECURITY: HOW HARD IS IT FOR YOU TO PAY FOR THE VERY BASICS LIKE FOOD, HOUSING, MEDICAL CARE, AND HEATING?: SOMEWHAT HARD

## 2024-09-15 SDOH — HEALTH STABILITY: PHYSICAL HEALTH: ON AVERAGE, HOW MANY MINUTES DO YOU ENGAGE IN EXERCISE AT THIS LEVEL?: 20 MIN

## 2024-09-15 SDOH — ECONOMIC STABILITY: INCOME INSECURITY: IN THE LAST 12 MONTHS, WAS THERE A TIME WHEN YOU WERE NOT ABLE TO PAY THE MORTGAGE OR RENT ON TIME?: NO

## 2024-09-15 SDOH — HEALTH STABILITY: MENTAL HEALTH
STRESS IS WHEN SOMEONE FEELS TENSE, NERVOUS, ANXIOUS, OR CAN'T SLEEP AT NIGHT BECAUSE THEIR MIND IS TROUBLED. HOW STRESSED ARE YOU?: TO SOME EXTENT

## 2024-09-15 SDOH — HEALTH STABILITY: PHYSICAL HEALTH: ON AVERAGE, HOW MANY DAYS PER WEEK DO YOU ENGAGE IN MODERATE TO STRENUOUS EXERCISE (LIKE A BRISK WALK)?: 2 DAYS

## 2024-09-15 ASSESSMENT — SOCIAL DETERMINANTS OF HEALTH (SDOH)
HOW OFTEN DO YOU ATTEND CHURCH OR RELIGIOUS SERVICES?: NEVER
HOW OFTEN DO YOU HAVE A DRINK CONTAINING ALCOHOL: MONTHLY OR LESS
ARE YOU MARRIED, WIDOWED, DIVORCED, SEPARATED, NEVER MARRIED, OR LIVING WITH A PARTNER?: LIVING WITH PARTNER
HOW MANY DRINKS CONTAINING ALCOHOL DO YOU HAVE ON A TYPICAL DAY WHEN YOU ARE DRINKING: 1 OR 2
HOW OFTEN DO YOU GET TOGETHER WITH FRIENDS OR RELATIVES?: TWICE A WEEK
HOW OFTEN DO YOU GET TOGETHER WITH FRIENDS OR RELATIVES?: TWICE A WEEK
DO YOU BELONG TO ANY CLUBS OR ORGANIZATIONS SUCH AS CHURCH GROUPS UNIONS, FRATERNAL OR ATHLETIC GROUPS, OR SCHOOL GROUPS?: NO
HOW OFTEN DO YOU ATTEND CHURCH OR RELIGIOUS SERVICES?: NEVER
DO YOU BELONG TO ANY CLUBS OR ORGANIZATIONS SUCH AS CHURCH GROUPS UNIONS, FRATERNAL OR ATHLETIC GROUPS, OR SCHOOL GROUPS?: NO
HOW OFTEN DO YOU HAVE SIX OR MORE DRINKS ON ONE OCCASION: NEVER
IN A TYPICAL WEEK, HOW MANY TIMES DO YOU TALK ON THE PHONE WITH FAMILY, FRIENDS, OR NEIGHBORS?: MORE THAN THREE TIMES A WEEK
HOW HARD IS IT FOR YOU TO PAY FOR THE VERY BASICS LIKE FOOD, HOUSING, MEDICAL CARE, AND HEATING?: SOMEWHAT HARD
WITHIN THE PAST 12 MONTHS, YOU WORRIED THAT YOUR FOOD WOULD RUN OUT BEFORE YOU GOT THE MONEY TO BUY MORE: NEVER TRUE
IN A TYPICAL WEEK, HOW MANY TIMES DO YOU TALK ON THE PHONE WITH FAMILY, FRIENDS, OR NEIGHBORS?: MORE THAN THREE TIMES A WEEK
IN THE PAST 12 MONTHS, HAS THE ELECTRIC, GAS, OIL, OR WATER COMPANY THREATENED TO SHUT OFF SERVICE IN YOUR HOME?: NO
HOW OFTEN DO YOU ATTENT MEETINGS OF THE CLUB OR ORGANIZATION YOU BELONG TO?: PATIENT DECLINED
ARE YOU MARRIED, WIDOWED, DIVORCED, SEPARATED, NEVER MARRIED, OR LIVING WITH A PARTNER?: LIVING WITH PARTNER
HOW OFTEN DO YOU ATTENT MEETINGS OF THE CLUB OR ORGANIZATION YOU BELONG TO?: PATIENT DECLINED

## 2024-09-15 ASSESSMENT — LIFESTYLE VARIABLES
HOW OFTEN DO YOU HAVE SIX OR MORE DRINKS ON ONE OCCASION: NEVER
HOW OFTEN DO YOU HAVE A DRINK CONTAINING ALCOHOL: MONTHLY OR LESS
SKIP TO QUESTIONS 9-10: 1
AUDIT-C TOTAL SCORE: 1
HOW MANY STANDARD DRINKS CONTAINING ALCOHOL DO YOU HAVE ON A TYPICAL DAY: 1 OR 2

## 2024-09-16 ENCOUNTER — HOSPITAL ENCOUNTER (OUTPATIENT)
Dept: LAB | Facility: MEDICAL CENTER | Age: 29
End: 2024-09-16
Attending: STUDENT IN AN ORGANIZED HEALTH CARE EDUCATION/TRAINING PROGRAM
Payer: COMMERCIAL

## 2024-09-16 ENCOUNTER — OFFICE VISIT (OUTPATIENT)
Dept: MEDICAL GROUP | Facility: PHYSICIAN GROUP | Age: 29
End: 2024-09-16
Payer: COMMERCIAL

## 2024-09-16 VITALS
HEART RATE: 72 BPM | BODY MASS INDEX: 29.32 KG/M2 | SYSTOLIC BLOOD PRESSURE: 114 MMHG | WEIGHT: 176 LBS | OXYGEN SATURATION: 96 % | HEIGHT: 65 IN | TEMPERATURE: 98 F | DIASTOLIC BLOOD PRESSURE: 70 MMHG

## 2024-09-16 DIAGNOSIS — R53.82 CHRONIC FATIGUE: ICD-10-CM

## 2024-09-16 DIAGNOSIS — Z87.11 HISTORY OF PEPTIC ULCER: ICD-10-CM

## 2024-09-16 DIAGNOSIS — Z76.89 ENCOUNTER TO ESTABLISH CARE: ICD-10-CM

## 2024-09-16 DIAGNOSIS — Z00.00 WELLNESS EXAMINATION: ICD-10-CM

## 2024-09-16 DIAGNOSIS — G89.29 CHRONIC JOINT PAIN: ICD-10-CM

## 2024-09-16 DIAGNOSIS — R73.03 PREDIABETES: ICD-10-CM

## 2024-09-16 DIAGNOSIS — F41.9 ANXIETY: ICD-10-CM

## 2024-09-16 DIAGNOSIS — H01.002 BLEPHARITIS OF RIGHT LOWER EYELID, UNSPECIFIED TYPE: ICD-10-CM

## 2024-09-16 DIAGNOSIS — M25.50 CHRONIC JOINT PAIN: ICD-10-CM

## 2024-09-16 DIAGNOSIS — J45.990 EXERCISE-INDUCED ASTHMA: ICD-10-CM

## 2024-09-16 DIAGNOSIS — N96 HISTORY OF RECURRENT MISCARRIAGES: ICD-10-CM

## 2024-09-16 PROBLEM — R21 RASH: Status: RESOLVED | Noted: 2022-04-18 | Resolved: 2024-09-16

## 2024-09-16 PROBLEM — R10.13 RECURRENT EPIGASTRIC ABDOMINAL PAIN: Status: RESOLVED | Noted: 2021-02-26 | Resolved: 2024-09-16

## 2024-09-16 PROBLEM — R42 DIZZINESS: Status: RESOLVED | Noted: 2022-04-18 | Resolved: 2024-09-16

## 2024-09-16 LAB
ALBUMIN SERPL BCP-MCNC: 4.4 G/DL (ref 3.2–4.9)
ALBUMIN/GLOB SERPL: 1.4 G/DL
ALP SERPL-CCNC: 83 U/L (ref 30–99)
ALT SERPL-CCNC: 18 U/L (ref 2–50)
ANION GAP SERPL CALC-SCNC: 14 MMOL/L (ref 7–16)
AST SERPL-CCNC: 15 U/L (ref 12–45)
BASOPHILS # BLD AUTO: 0.7 % (ref 0–1.8)
BASOPHILS # BLD: 0.05 K/UL (ref 0–0.12)
BILIRUB SERPL-MCNC: 0.6 MG/DL (ref 0.1–1.5)
BUN SERPL-MCNC: 11 MG/DL (ref 8–22)
CALCIUM ALBUM COR SERPL-MCNC: 8.3 MG/DL (ref 8.5–10.5)
CALCIUM SERPL-MCNC: 8.6 MG/DL (ref 8.5–10.5)
CHLORIDE SERPL-SCNC: 104 MMOL/L (ref 96–112)
CHOLEST SERPL-MCNC: 152 MG/DL (ref 100–199)
CO2 SERPL-SCNC: 22 MMOL/L (ref 20–33)
CREAT SERPL-MCNC: 0.66 MG/DL (ref 0.5–1.4)
EOSINOPHIL # BLD AUTO: 0.1 K/UL (ref 0–0.51)
EOSINOPHIL NFR BLD: 1.3 % (ref 0–6.9)
ERYTHROCYTE [DISTWIDTH] IN BLOOD BY AUTOMATED COUNT: 46.2 FL (ref 35.9–50)
FASTING STATUS PATIENT QL REPORTED: NORMAL
GFR SERPLBLD CREATININE-BSD FMLA CKD-EPI: 121 ML/MIN/1.73 M 2
GLOBULIN SER CALC-MCNC: 3.1 G/DL (ref 1.9–3.5)
GLUCOSE SERPL-MCNC: 94 MG/DL (ref 65–99)
HCT VFR BLD AUTO: 41.9 % (ref 37–47)
HDLC SERPL-MCNC: 45 MG/DL
HGB BLD-MCNC: 13.8 G/DL (ref 12–16)
IMM GRANULOCYTES # BLD AUTO: 0.02 K/UL (ref 0–0.11)
IMM GRANULOCYTES NFR BLD AUTO: 0.3 % (ref 0–0.9)
LDLC SERPL CALC-MCNC: 83 MG/DL
LYMPHOCYTES # BLD AUTO: 2.79 K/UL (ref 1–4.8)
LYMPHOCYTES NFR BLD: 36.3 % (ref 22–41)
MCH RBC QN AUTO: 29.1 PG (ref 27–33)
MCHC RBC AUTO-ENTMCNC: 32.9 G/DL (ref 32.2–35.5)
MCV RBC AUTO: 88.4 FL (ref 81.4–97.8)
MONOCYTES # BLD AUTO: 0.51 K/UL (ref 0–0.85)
MONOCYTES NFR BLD AUTO: 6.6 % (ref 0–13.4)
NEUTROPHILS # BLD AUTO: 4.21 K/UL (ref 1.82–7.42)
NEUTROPHILS NFR BLD: 54.8 % (ref 44–72)
NRBC # BLD AUTO: 0 K/UL
NRBC BLD-RTO: 0 /100 WBC (ref 0–0.2)
PLATELET # BLD AUTO: 496 K/UL (ref 164–446)
PMV BLD AUTO: 9.2 FL (ref 9–12.9)
POTASSIUM SERPL-SCNC: 4.5 MMOL/L (ref 3.6–5.5)
PROT SERPL-MCNC: 7.5 G/DL (ref 6–8.2)
RBC # BLD AUTO: 4.74 M/UL (ref 4.2–5.4)
SODIUM SERPL-SCNC: 140 MMOL/L (ref 135–145)
TRIGL SERPL-MCNC: 119 MG/DL (ref 0–149)
WBC # BLD AUTO: 7.7 K/UL (ref 4.8–10.8)

## 2024-09-16 PROCEDURE — 99215 OFFICE O/P EST HI 40 MIN: CPT | Performed by: STUDENT IN AN ORGANIZED HEALTH CARE EDUCATION/TRAINING PROGRAM

## 2024-09-16 PROCEDURE — 85025 COMPLETE CBC W/AUTO DIFF WBC: CPT

## 2024-09-16 PROCEDURE — 80053 COMPREHEN METABOLIC PANEL: CPT

## 2024-09-16 PROCEDURE — 80061 LIPID PANEL: CPT

## 2024-09-16 PROCEDURE — 36415 COLL VENOUS BLD VENIPUNCTURE: CPT

## 2024-09-16 RX ORDER — ALBUTEROL SULFATE 90 UG/1
2 INHALANT RESPIRATORY (INHALATION) EVERY 4 HOURS PRN
Qty: 1 EACH | Refills: 3 | Status: SHIPPED | OUTPATIENT
Start: 2024-09-16

## 2024-09-16 RX ORDER — ERYTHROMYCIN 5 MG/G
1 OINTMENT OPHTHALMIC
Qty: 3.5 G | Refills: 0 | Status: SHIPPED | OUTPATIENT
Start: 2024-09-16

## 2024-09-16 ASSESSMENT — PATIENT HEALTH QUESTIONNAIRE - PHQ9: CLINICAL INTERPRETATION OF PHQ2 SCORE: 0

## 2024-09-16 ASSESSMENT — ENCOUNTER SYMPTOMS
CHILLS: 0
SHORTNESS OF BREATH: 0
FEVER: 0
PALPITATIONS: 0

## 2024-09-16 NOTE — PROGRESS NOTES
Subjective:   Verbal consent was acquired by the patient to use [employer] ambient listening note generation during this visit Yes     CC:  Diagnoses of Encounter to establish care, History of recurrent miscarriages, Exercise-induced asthma, Anxiety, Chronic fatigue, Chronic joint pain, Prediabetes, Blepharitis of right lower eyelid, unspecified type, and Wellness examination were pertinent to this visit.    History of Present Illness  Ms. Riley is a pleasant 28 yo who presents today to establish care.     She experiences exercise-induced asthma, characterized by shortness of breath during physical activity. Additionally, she reports a severe cough and shortness of breath upon waking, which improves with movement. She has an  albuterol inhaler at home.    Her anxiety has increased since the COVID-19 pandemic, particularly in large groups, causing chest tightness. She is interested in seeing a therapist for this issue.    She suffers from chronic fatigue. Blood tests revealed prediabetes, which may contribute to her fatigue. She struggles with sleep, often tossing and turning, but does not snore or experience episodes of stopped breathing during sleep.    Patient reports that she has had three miscarriages in two years. She has been under the care of Dr. Donna Kaplan, OB/GYN, who conducted a comprehensive blood test and transvaginal ultrasound. She is currently spotting and unsure if it is related to her menstrual cycle or her recent miscarriage. She has no upcoming appointments until her next Pap smear in . She had a Pap smear in 2024. Patient reports that she had labs completed for lupus and they returned back normal.    She has experienced dizziness, which has improved, and had a rash in 2022.   She was diagnosed with peptic ulcers following abdominal pain, which have improved but occasionally flare up.    She has toenail fungus that has worsened despite various treatments. She experiences  "occasional knee pain.    She has recently started monitoring her diet.    She has a painful right eye, which began yesterday and feels as though she was punched. The bottom corner of the eye is slightly swollen.    She recalls a liver panel conducted during her teenage years, which suggested heavy alcohol consumption.    SOCIAL HISTORY  She denies any history of vaping or smoking. She drinks alcohol occasionally. She denies any drug use.    FAMILY HISTORY  Her mother has arthritis, migraines, and trigeminal neuralgia. Her father has arthritis and joint issues. Her brother has asthma. Her maternal grandmother has diabetes, high cholesterol, high blood pressure, and ovarian cancer. She denies any family history of colon or breast cancer.    Health Maintenance: Completed  -Records requested from patients OB  -Influenza, covid, and pneumonia vaccine were recommended    ROS:   Review of Systems   Constitutional:  Negative for chills and fever.   Respiratory:  Negative for shortness of breath.    Cardiovascular:  Negative for chest pain and palpitations.         Objective:     Exam: /70 (BP Location: Right arm, Patient Position: Sitting, BP Cuff Size: Adult)   Pulse 72   Temp 36.7 °C (98 °F) (Temporal)   Ht 1.651 m (5' 5\")   Wt 79.8 kg (176 lb)   SpO2 96%  Body mass index is 29.29 kg/m².    Physical Exam  Constitutional:       Appearance: Normal appearance.   Eyes:      General:         Right eye: No discharge.      Conjunctiva/sclera: Conjunctivae normal.      Right eye: Right conjunctiva is not injected.        Comments: Left lower lid swelling   Cardiovascular:      Rate and Rhythm: Normal rate and regular rhythm.      Heart sounds: Normal heart sounds.   Pulmonary:      Effort: Pulmonary effort is normal. No respiratory distress.      Breath sounds: Normal breath sounds. No stridor. No wheezing, rhonchi or rales.   Neurological:      Mental Status: She is alert.             Assessment & Plan:   29 y.o. " female with the following -    1. Encounter to establish care  Patient presents today to establish care. Chart was reviewed and history was discussed in detail with the patient.  Records from OB/GYN have been requested    2. History of recurrent miscarriages  Patient reports having three miscarriages during the past two years. She is established with OB-Gyne. Patient recently suffered from a miscarriage. She has had lupus labs completed, which I reviewed through the patients portal on her phone. Labs appeared to be normal. Records from OB-gyne have been requested.     3. Exercise-induced asthma  Chronic, stable.  Patient reports that she has noticed a cough shortness of breath when waking up in the morning.  I believe this could be due to patient's asthma.  Will have patient use albuterol inhaler when she wakes up in the morning.  Refill for albuterol inhaler sent to patient's pharmacy.  - albuterol 108 (90 Base) MCG/ACT Aero Soln inhalation aerosol; Inhale 2 Puffs every four hours as needed for Shortness of Breath.  Dispense: 1 Each; Refill: 3    4. Anxiety  Chronic, stable.  Patient reports that when she is in large crowds she feels anxious.  She is not interested in medication at this time.  Will refer patient to behavioral health.  - Referral to Behavioral Health    5. Chronic fatigue  6. Chronic joint pain  Chronic, ongoing.  Per chart review patient has had autoimmune workup completed through her previous PCP with normal findings.  Patient's fatigue less likely due to undiagnosed sleep apnea, however patient was advised to monitor for signs of snoring and apneic episodes.    7. Prediabetes  Chronic, stable.  Patient had labs completed through her OB and was found to have an A1c of 5.7%.  At this time patient to work on lifestyle modifications with diet and exercise.  Patient was advised to cut down on carbohydrates and sugar containing products.  Will repeat A1c again in 3 to 6 months.    8. Blepharitis of  right lower eyelid, unspecified type  Acute, ongoing.  Patient has swelling of the left likely due to blepharitis, patient was advised to apply warm compresses to the area.  Prescription for erythromycin eye ointment sent to patient's pharmacy.  Patient was advised to notify me if she is starts having pain with eye movement, increase in swelling and erythema.  - erythromycin 5 MG/GM Ointment; Apply 1 Application to right eye at bedtime.  Dispense: 3.5 g; Refill: 0    9. Wellness examination  - Lipid Profile; Future  - Comp Metabolic Panel; Future  - CBC WITH DIFFERENTIAL; Future    10. History of peptic ulcer  Patient reports having history of peptic ulcer disease.      I spent a total of 40 minutes with record review, exam, communication with the patient, communication with other providers, and documentation of this encounter.    Return if symptoms worsen or fail to improve.    Please note that this dictation was created using voice recognition software. I have made every reasonable attempt to correct obvious errors, but I expect that there are errors of grammar and possibly content that I did not discover before finalizing the note.

## 2024-09-16 NOTE — LETTER
[facility]  Fiona Last M.D.  [address] [place] [address]  Fax: [phone]   Authorization for Release/Disclosure of   Protected Health Information   Name: GM MITCHELL : 1995 SSN: [id number]   Address: [facility] [address]   [place] [address] Phone:    There are no phone numbers on file.   I authorize the entity listed below to release/disclose the PHI below to:   [facility]/Fiona Last M.D. and Fiona Last M.D.   Provider or Entity Name:  [facility]   Address   City, State, Zip  [address], [place] [address] Phone:      Fax:  [phone]   Reason for request: continuity of care   Information to be released:    [  ] LAST COLONOSCOPY,  including any PATH REPORT and follow-up  [  ] LAST FIT/COLOGUARD RESULT [  ] LAST DEXA  [  ] LAST MAMMOGRAM  [  ] LAST PAP  [  ] LAST LABS [  ] RETINA EXAM REPORT  [  ] IMMUNIZATION RECORDS  [xxxxx ] Release all info      [  ] Check here and initial the line next to each item to release ALL health information INCLUDING  _____ Care and treatment for drug and / or alcohol abuse  _____ HIV testing, infection status, or AIDS  _____ Genetic Testing    DATES OF SERVICE OR TIME PERIOD TO BE DISCLOSED: _____________  I understand and acknowledge that:  * This Authorization may be revoked at any time by you in writing, except if your health information has already been used or disclosed.  * Your health information that will be used or disclosed as a result of you signing this authorization could be re-disclosed by the recipient. If this occurs, your re-disclosed health information may no longer be protected by State or Federal laws.  * You may refuse to sign this Authorization. Your refusal will not affect your ability to obtain treatment.  * This Authorization becomes effective upon signing and will  on (date) __________.      If no date is indicated, this Authorization will  one (1) year from the signature date.    Name: Gm  Osvaldo  Signature: Date:   9/16/2024     PLEASE FAX REQUESTED RECORDS BACK TO: [phone]

## 2025-04-14 ENCOUNTER — HOSPITAL ENCOUNTER (EMERGENCY)
Facility: MEDICAL CENTER | Age: 30
End: 2025-04-14
Attending: OBSTETRICS & GYNECOLOGY | Admitting: OBSTETRICS & GYNECOLOGY
Payer: COMMERCIAL

## 2025-04-14 VITALS
RESPIRATION RATE: 16 BRPM | HEIGHT: 65 IN | WEIGHT: 198 LBS | HEART RATE: 93 BPM | OXYGEN SATURATION: 98 % | BODY MASS INDEX: 32.99 KG/M2 | TEMPERATURE: 97.6 F | DIASTOLIC BLOOD PRESSURE: 60 MMHG | SYSTOLIC BLOOD PRESSURE: 124 MMHG

## 2025-04-14 LAB
APPEARANCE UR: CLEAR
BILIRUB UR QL STRIP.AUTO: NEGATIVE
COLOR UR AUTO: YELLOW
FIBRONECTIN FETAL SPEC QL: NEGATIVE
GLUCOSE UR QL STRIP.AUTO: NEGATIVE MG/DL
KETONES UR QL STRIP.AUTO: NEGATIVE MG/DL
LEUKOCYTE ESTERASE UR QL STRIP.AUTO: NEGATIVE
NITRITE UR QL STRIP.AUTO: NEGATIVE
PH UR STRIP.AUTO: 7 [PH] (ref 5–8)
PROT UR QL STRIP: NEGATIVE MG/DL
RBC UR QL AUTO: NEGATIVE
SP GR UR STRIP.AUTO: 1.01 (ref 1–1.03)
UROBILINOGEN UR STRIP.AUTO-MCNC: 0.2 MG/DL

## 2025-04-14 PROCEDURE — 81002 URINALYSIS NONAUTO W/O SCOPE: CPT

## 2025-04-14 PROCEDURE — 99282 EMERGENCY DEPT VISIT SF MDM: CPT

## 2025-04-14 PROCEDURE — 59025 FETAL NON-STRESS TEST: CPT

## 2025-04-14 PROCEDURE — 82731 ASSAY OF FETAL FIBRONECTIN: CPT

## 2025-04-14 ASSESSMENT — FIBROSIS 4 INDEX: FIB4 SCORE: 0.21

## 2025-04-14 ASSESSMENT — PAIN SCALES - GENERAL: PAINLEVEL: 5 - MODERATE PAIN

## 2025-04-14 NOTE — PROGRESS NOTES
, EDC , [place] 31w4d. Patient presents to L&D with c/o left-sided abdominal pain since  yesterday. She describes the pain as achy with sharp jolts worsened by movement and currently reports it is 5/10. Patient denies LOF, VB, or UCs and reports + fetal movement. Patient escorted to triage room, oriented to room and unit, and external monitors applied. POC discussed with patient and s/o and encouraged to state needs or questions at any time.    1137 Dr. Flores given report, orders received.    1152 Sterile speculum exam by this RN; cervix appears closed, sample collected for FFN.    1315 Dr. Flores updated.    1320 Dr. Flores at bedside, POC discussed with patient, D/C order received.    1334 L&D D/C instructions reviewed with patient and s/o who state understanding. Patient d/c to self with s/o.

## 2025-04-14 NOTE — ED PROVIDER NOTES
DATE OF ADMISSION:  2025     IDENTIFICATION:  This is a 29-year-old  5, para1-0-3-1 with an EDC of   2025 and EGA of 31 and 4/7 weeks, who presents with a chief complaint of   left-sided pain.     HISTORY OF PRESENT ILLNESS:  This is a patient of Dr. Torre's who has gotten   good prenatal care.  Prenatal care has been uncomplicated.  She called the   office today complaining of constant left lower quadrant pain.  She denied any   uterine contractions, spontaneous rupture of membranes or vaginal bleeding.    She admits to good fetal movement.  She denied any symptoms of PIH.  She was   given precautions and subsequently came here to Labor and Delivery because she   was concerned in the context for 3 prior spontaneous abortions.  Here in   Labor and Delivery, fetal heart tracing is reactive, category 1.  She is not   aravind.  Her fetal fibronectin is negative.  She does have followup with   Dr. Torre on Friday.  She denies nausea, vomiting, fever, chills, change in   bowel or bladder habits.  She admits to good fetal movement.  Denies symptoms   of PIH.     OBSTETRICAL HISTORY:  Significant for 3 spontaneous abortions and a vaginal   delivery at term.     GYNECOLOGIC HISTORY:  Denies STDs, abnormal Paps.     ALLERGIES:  SHELLFISH.     CURRENT MEDICATIONS:  Prenatal vitamins, vitamin D and iron.     MEDICAL PROBLEMS:  Anemia.     SURGICAL HISTORY:  None.     SOCIAL HISTORY:  Denies alcohol, tobacco, or drug of abuse.     FAMILY HISTORY:  Noncontributory.     REVIEW OF SYSTEMS:  Times 12 is negative per AMA standards available in chart.     LABORATORY DATA:  Fetal fibronectin was negative.     PHYSICAL EXAMINATION:  VITAL SIGNS:  The patient is afebrile.  Vital signs are within normal limits.    Fetal heart tracing is reactive, category 1.  She is not aravind.  GENERAL:  She is awake, alert, and in no apparent distress.  NECK:  Supple.  HEART:  Regular.  CHEST:  Clear.  BREASTS:   Symmetrical.  ABDOMEN:  Soft, gravid, size appropriate for dates.  EXTREMITIES:  Negative.     ASSESSMENT:  1.  Pregnancy at 31 and 4/7 weeks with lower abdominal pain consistent with   round ligament discomfort.  2.  Fetal status reassuring.  3.  Negative fetal fibronectin.     PLAN:  1.  Discharge home.  2.  Follow up on Friday with Dr. Torre.  3.  Kick counts.  4.   labor precautions.  5.  PPROM precautions given.  The patient will come back to labor and delivery   with change in her status or fetal status.        ______________________________  MD ELIANE Daniel/KERRY    DD:  2025 13:31  DT:  2025 16:12    Job#:  [id number]

## 2025-05-15 ENCOUNTER — APPOINTMENT (OUTPATIENT)
Dept: RADIOLOGY | Facility: MEDICAL CENTER | Age: 30
End: 2025-05-15
Attending: OBSTETRICS & GYNECOLOGY
Payer: COMMERCIAL

## 2025-05-15 ENCOUNTER — HOSPITAL ENCOUNTER (OUTPATIENT)
Facility: MEDICAL CENTER | Age: 30
End: 2025-05-16
Attending: OBSTETRICS & GYNECOLOGY | Admitting: OBSTETRICS & GYNECOLOGY
Payer: COMMERCIAL

## 2025-05-15 LAB
A1 MICROGLOB PLACENTAL VAG QL: NEGATIVE
HOLDING TUBE BB 8507: NORMAL

## 2025-05-15 PROCEDURE — 84112 EVAL AMNIOTIC FLUID PROTEIN: CPT

## 2025-05-15 PROCEDURE — 302449 STATCHG TRIAGE ONLY (STATISTIC)

## 2025-05-15 PROCEDURE — G0378 HOSPITAL OBSERVATION PER HR: HCPCS

## 2025-05-15 PROCEDURE — 700105 HCHG RX REV CODE 258: Performed by: OBSTETRICS & GYNECOLOGY

## 2025-05-15 PROCEDURE — 76816 OB US FOLLOW-UP PER FETUS: CPT

## 2025-05-15 PROCEDURE — 76815 OB US LIMITED FETUS(S): CPT

## 2025-05-15 PROCEDURE — 36415 COLL VENOUS BLD VENIPUNCTURE: CPT

## 2025-05-15 PROCEDURE — 302790 HCHG STAT ANTEPARTUM CARE, DAILY

## 2025-05-15 RX ORDER — SODIUM CHLORIDE, SODIUM LACTATE, POTASSIUM CHLORIDE, AND CALCIUM CHLORIDE .6; .31; .03; .02 G/100ML; G/100ML; G/100ML; G/100ML
1000 INJECTION, SOLUTION INTRAVENOUS ONCE
Status: COMPLETED | OUTPATIENT
Start: 2025-05-15 | End: 2025-05-15

## 2025-05-15 RX ORDER — SODIUM CHLORIDE, SODIUM LACTATE, POTASSIUM CHLORIDE, CALCIUM CHLORIDE 600; 310; 30; 20 MG/100ML; MG/100ML; MG/100ML; MG/100ML
INJECTION, SOLUTION INTRAVENOUS CONTINUOUS
Status: DISCONTINUED | OUTPATIENT
Start: 2025-05-15 | End: 2025-05-16 | Stop reason: HOSPADM

## 2025-05-15 RX ADMIN — SODIUM CHLORIDE, POTASSIUM CHLORIDE, SODIUM LACTATE AND CALCIUM CHLORIDE 1000 ML: 600; 310; 30; 20 INJECTION, SOLUTION INTRAVENOUS at 21:03

## 2025-05-15 RX ADMIN — SODIUM CHLORIDE, POTASSIUM CHLORIDE, SODIUM LACTATE AND CALCIUM CHLORIDE: 600; 310; 30; 20 INJECTION, SOLUTION INTRAVENOUS at 23:10

## 2025-05-15 ASSESSMENT — PAIN SCALES - GENERAL: PAINLEVEL: 0 - NO PAIN

## 2025-05-15 ASSESSMENT — FIBROSIS 4 INDEX: FIB4 SCORE: 0.21

## 2025-05-16 ENCOUNTER — APPOINTMENT (OUTPATIENT)
Dept: RADIOLOGY | Facility: MEDICAL CENTER | Age: 30
End: 2025-05-16
Attending: OBSTETRICS & GYNECOLOGY
Payer: COMMERCIAL

## 2025-05-16 VITALS
DIASTOLIC BLOOD PRESSURE: 67 MMHG | RESPIRATION RATE: 16 BRPM | BODY MASS INDEX: 34.16 KG/M2 | OXYGEN SATURATION: 96 % | HEIGHT: 65 IN | TEMPERATURE: 96.8 F | WEIGHT: 205 LBS | HEART RATE: 100 BPM | SYSTOLIC BLOOD PRESSURE: 134 MMHG

## 2025-05-16 PROBLEM — Z03.71 ADMISSION FOR OBSERVATION OF SUSPECTED OLIGOHYDRAMNIOS NOT FOUND: Status: ACTIVE | Noted: 2025-05-16

## 2025-05-16 PROCEDURE — G0378 HOSPITAL OBSERVATION PER HR: HCPCS

## 2025-05-16 PROCEDURE — 700102 HCHG RX REV CODE 250 W/ 637 OVERRIDE(OP): Performed by: OBSTETRICS & GYNECOLOGY

## 2025-05-16 PROCEDURE — 700105 HCHG RX REV CODE 258: Performed by: OBSTETRICS & GYNECOLOGY

## 2025-05-16 PROCEDURE — 76819 FETAL BIOPHYS PROFIL W/O NST: CPT

## 2025-05-16 PROCEDURE — A9270 NON-COVERED ITEM OR SERVICE: HCPCS | Performed by: OBSTETRICS & GYNECOLOGY

## 2025-05-16 RX ORDER — VITAMIN A ACETATE, BETA CAROTENE, ASCORBIC ACID, CHOLECALCIFEROL, .ALPHA.-TOCOPHEROL ACETATE, DL-, THIAMINE MONONITRATE, RIBOFLAVIN, NIACINAMIDE, PYRIDOXINE HYDROCHLORIDE, FOLIC ACID, CYANOCOBALAMIN, CALCIUM CARBONATE, FERROUS FUMARATE, ZINC OXIDE, CUPRIC OXIDE 3080; 12; 120; 400; 1; 1.84; 3; 20; 22; 920; 25; 200; 27; 10; 2 [IU]/1; UG/1; MG/1; [IU]/1; MG/1; MG/1; MG/1; MG/1; MG/1; [IU]/1; MG/1; MG/1; MG/1; MG/1; MG/1
1 TABLET, FILM COATED ORAL
Status: DISCONTINUED | OUTPATIENT
Start: 2025-05-16 | End: 2025-05-16 | Stop reason: HOSPADM

## 2025-05-16 RX ORDER — FERROUS SULFATE 325(65) MG
325 TABLET ORAL DAILY
COMMUNITY

## 2025-05-16 RX ORDER — VITAMIN B COMPLEX
1000 TABLET ORAL DAILY
COMMUNITY

## 2025-05-16 RX ADMIN — PRENATAL WITH FERROUS FUM AND FOLIC ACID 1 TABLET: 3080; 920; 120; 400; 22; 1.84; 3; 20; 10; 1; 12; 200; 27; 25; 2 TABLET ORAL at 08:08

## 2025-05-16 RX ADMIN — SODIUM CHLORIDE, POTASSIUM CHLORIDE, SODIUM LACTATE AND CALCIUM CHLORIDE: 600; 310; 30; 20 INJECTION, SOLUTION INTRAVENOUS at 07:20

## 2025-05-16 ASSESSMENT — LIFESTYLE VARIABLES
HAVE PEOPLE ANNOYED YOU BY CRITICIZING YOUR DRINKING: NO
EVER HAD A DRINK FIRST THING IN THE MORNING TO STEADY YOUR NERVES TO GET RID OF A HANGOVER: NO
TOTAL SCORE: 0
TOTAL SCORE: 0
EVER FELT BAD OR GUILTY ABOUT YOUR DRINKING: NO
ALCOHOL_USE: NO
CONSUMPTION TOTAL: INCOMPLETE
TOTAL SCORE: 0
HAVE YOU EVER FELT YOU SHOULD CUT DOWN ON YOUR DRINKING: NO

## 2025-05-16 ASSESSMENT — SOCIAL DETERMINANTS OF HEALTH (SDOH)
WITHIN THE LAST YEAR, HAVE YOU BEEN HUMILIATED OR EMOTIONALLY ABUSED IN OTHER WAYS BY YOUR PARTNER OR EX-PARTNER?: NO
WITHIN THE LAST YEAR, HAVE TO BEEN RAPED OR FORCED TO HAVE ANY KIND OF SEXUAL ACTIVITY BY YOUR PARTNER OR EX-PARTNER?: NO
WITHIN THE PAST 12 MONTHS, YOU WORRIED THAT YOUR FOOD WOULD RUN OUT BEFORE YOU GOT THE MONEY TO BUY MORE: NEVER TRUE
WITHIN THE PAST 12 MONTHS, THE FOOD YOU BOUGHT JUST DIDN'T LAST AND YOU DIDN'T HAVE MONEY TO GET MORE: NEVER TRUE
WITHIN THE LAST YEAR, HAVE YOU BEEN KICKED, HIT, SLAPPED, OR OTHERWISE PHYSICALLY HURT BY YOUR PARTNER OR EX-PARTNER?: NO
WITHIN THE LAST YEAR, HAVE YOU BEEN AFRAID OF YOUR PARTNER OR EX-PARTNER?: NO
IN THE PAST 12 MONTHS, HAS THE ELECTRIC, GAS, OIL, OR WATER COMPANY THREATENED TO SHUT OFF SERVICE IN YOUR HOME?: NO

## 2025-05-16 ASSESSMENT — PATIENT HEALTH QUESTIONNAIRE - PHQ9
SUM OF ALL RESPONSES TO PHQ9 QUESTIONS 1 AND 2: 0
1. LITTLE INTEREST OR PLEASURE IN DOING THINGS: NOT AT ALL
2. FEELING DOWN, DEPRESSED, IRRITABLE, OR HOPELESS: NOT AT ALL
1. LITTLE INTEREST OR PLEASURE IN DOING THINGS: NOT AT ALL
2. FEELING DOWN, DEPRESSED, IRRITABLE, OR HOPELESS: NOT AT ALL
SUM OF ALL RESPONSES TO PHQ9 QUESTIONS 1 AND 2: 0

## 2025-05-16 ASSESSMENT — PAIN DESCRIPTION - PAIN TYPE
TYPE: ACUTE PAIN
TYPE: ACUTE PAIN

## 2025-05-16 NOTE — ED NOTES
Medication history reviewed with patient.  Med rec is complete.  Allergies reviewed.     Patient denies any outpatient antibiotics or anticoagulants in the last 30 days.     Dispense history available in EPIC? None      Katelyn Valentine PhT

## 2025-05-16 NOTE — CARE PLAN
The patient is Watcher - Medium risk of patient condition declining or worsening    Shift Goals  Clinical Goals: Monitor fetal well-being and FHR  Patient Goals: stay pregnant  Family Goals: support      Problem: Knowledge Deficit - L&D  Goal: Patient and family/caregivers will demonstrate understanding of plan of care, disease process/condition, diagnostic tests and medications  Outcome: Progressing  POC discussed with pt. Pt verbalizes understanding and asks questions as needed.     Problem: Risk for Injury  Goal: Patient and fetus will be free of preventable injury/complications  Outcome: Progressing   EFM and toco in place for continuous monitoring of FHR and uterine activity. This RN will continue to assess fetal tolerance to oligo. Pt head to toe complete as baseline for any changes in pt condition.

## 2025-05-16 NOTE — PROGRESS NOTES
1935: Report received from Vickie SEVILLA. POC discussed. Care assumed at this time time.     1940: Patient ambulated from LDA3 to S230 with RN escort. FOB following with belongings.     1956: VBO received from MD for IV fluid bolus and maintenance fluids @125 ml/hr at this time.     2057: IV started and labs drawn (See LDA). Admit profile asked and answered.     0700: Report given to Sarah SEVILLA. POC discussed. Care relinquished at this time.

## 2025-05-16 NOTE — CARE PLAN
The patient is Watcher - Medium risk of patient condition declining or worsening    Shift Goals  Clinical Goals: stable fetal/maternal status  Patient Goals: remain pregnant  Family Goals: education    Progress made toward(s) clinical / shift goals:    Problem: Knowledge Deficit - L&D  Goal: Patient and family/caregivers will demonstrate understanding of plan of care, disease process/condition, diagnostic tests and medications  Description: Target End Date:  1-3 days or as soon as patient condition allows1.  Oriented to unit, equipment, visitation policy and means for communicating concern2.  Complete/review Learning Assessment3.  Assess patient's preparation, knowledge level and expectations4.  Provide accurate information in basic terms, clarify misconceptions5.  Explain disease process/condition, treatment plan, diagnostic tests and medications6.  Encourage patient and support person to ask questions and verbalize their understanding7.  Instruct patient/support person in basic interpretation of fetal monitor8.  Obtain informed consent when appropriate9.  Demonstrate breathing/relaxation techniques  Outcome: Progressing     Problem: Psychosocial - L&D  Goal: Patient's level of anxiety will decrease  Description: Target End Date:  1-3 days or as soon as patient condition allows1.  Collaborate with patient and family/caregiver to identify triggers and develop strategies to cope with anxiety2.  Implement stimuli reduction, calming techniques3.  Pharmacologic management per provider order4.  Encourage patient/family/care giver participation5.  Collaborate with interdisciplinary team including Psychologist or Behavioral Health Team as needed  Outcome: Progressing  Goal: Patient will be able to discuss coping skills during hospitalization  Description: Target End Date:  1 to 3 days1.  Assess patient and support person's emotional response2.  Review patient and support person's participation and role in birth experience.   Identify positive behaviors during the process3.  Encourage presence/participation of support person  Outcome: Progressing  Goal: Patient's ability to re-evaluate and adapt role responsibilities will improve  Description: Target End Date:  Prior to discharge or change in level of care1.  Assess family support2.  Encourage support system participation in care3.  Encouraged verbalization of feelings regarding caregiver responsibilities4.  Discuss changes in role and responsibilities caused by patient's condition  Outcome: Progressing  Goal: Spiritual and cultural needs incorporated into hospitalization  Description: Target End Date:  End of day 11.  Encourage verbalization of feelings, concerns, expectations and needs2.  Collaborate with Case Management/Social Services3.  Collaborate with Pastoral Care to meet spiritual needs  Outcome: Progressing     Problem: Cardiac Output  Goal: Patient will remain normotensive throughout hospitalization  Description: Target End Date:  Prior to discharge or change in level of care1.  Assess and document BP, pulse and oxygen saturation2.  Administer antihypertensive drugs as ordered by provider  Outcome: Progressing     Problem: Pain  Goal: Patient's pain will be alleviated or reduced to the patient’s comfort goal  Description: Target End Date:  Prior to discharge or change in level of care1.  Document pain using the appropriate pain scale per order or unit policy2.  Administer pain medications per provider order and/or assist with epidural/spinal placement as needed3.  Pain management medications as ordered4.  Educate and implement non-pharmacologic comfort measures (i.e. relaxation, distraction, massage, cold/heat therapy, etc.)5.  Assess for nonverbal signs of ineffective coping with pain and offer pain medication and/or epidural anesthesia  Outcome: Progressing  Note: Patient will verbalize understanding of pain management options.     Problem: Risk for Fluid Imbalance  Goal:  Patient's fluid volume balance will be maintained or improve  Description: Target End Date:  1 to 3 days1.  Assess for signs and symptoms of postpartum and postoperative bleeding as appropriate2.  Monitor drainage for color, consistency, quantity3.  Report inadequate intake or output to provider4.  Promote oral intake as appropriate5.  Administer IV therapy as ordered6.  Discontinue IV fluids when tolerating PO or per provider order  Outcome: Progressing     Problem: Risk for Infection and Impaired Wound Healing  Goal: Patient will remain free from infection  Description: Target End Date:  1 to 3 days1.  Utilize Standard Precautions at all times to reduce the risk of transmission of microorganisms from both recognized and unrecognized sources of infection2.  Infection prevention handouts provided (general/device/diagnosis specific) and documented in Patient Education3.  Limit vaginal exams as necessary4.  Use aseptic technique during vaginal exams5.  Administer antibiotic therapy per provider order6.  Assess for removal of lines and drains7.  Line/drain care per policy and/or provider orders  Outcome: Progressing  Note: Patient will remain free from signs and symptoms of infection.  Goal: Patient's wound/surgical incision will decrease in size and heals properly  Description: Target End Date:  Prior to discharge or change in level of careDocument on LDA1.  Assess and document surgical incision/wound2.  Provide incision/wound care per policy and/or provider orders3.  Manage surgical drains per policy if applicable4.  Encourage adequate nutrition to promote wound healing5.  Collaborate with Clinical Dietician  Outcome: Progressing     Problem: Risk for Injury  Goal: Patient and fetus will be free of preventable injury/complications  Description: Target End Date:  Prior to discharge or change in level of care1.  Monitor uterine activity and if applicable progression of labor2.  Monitor fetal well being3.  Assure  resuscitative equipment is available and within reach  Outcome: Progressing     Problem: Risk for Venous Thromboembolism (VTE)  Goal: VTE prevention measures will be implemented and patient will remain free from VTE  Description: Target End Date:  Prior to discharge or change in level of care1.  Intermittent sequential compression device in place 23 hours per day or per provider order2.  Pharmacologic prophylaxis per provider order3.  Monitor for anticoagulation complications4.  Educate patient and family/caregiver about the importance of intermittent sequential and early ambulation when able5.  Educate/demonstrate to patient and family/caregiver about ankle flex, foot rotation and knee flex exercises in addition to other prophylactic measures every hour while awake  Outcome: Progressing     Problem: Discharge Barriers/Planning  Goal: Patient's continuum of care needs are met  Description: Target End Date:  Prior to discharge or change in level of care1.  Identify potential discharge barriers on admission and throughout hospitalization2.  Collaborate with Case Management, , Clinical Educators, Navigators and others on the transitional care team to meet discharge needs3.  Involve patient/family/caregivers in setting and prioritizing goals for hospitalization and discharge4.  Ensure Flu vaccinations are addressed5.  Inquire if patient is interested in the Meds to Bed program6.  Ensure patient and family/caregiver are able to demonstrate use of equipment as prescribed7.  Ensure patient and family/caregiver can verbalize understanding of patient education8.  Explain discharge instructions and medication reconciliation to patient and family/caregiver  Outcome: Progressing

## 2025-05-16 NOTE — PROGRESS NOTES
0700 - Report received from ROEL Killian. POC discussed. Care assumed.  0800 - Pt reports +FM. Pt denies LOF/ VB/ UC/ pain. Pt stated she occasionally feels mild/nonpainful abdominal tightening. This RN updated Dr. Durand on pt status. MD reviewed FHT and US. Orders receieved.  1031 - US at BS. EFM/toco removed.  1217 - This RN reviewed US results with Dr. Durand.  1235 - Report given to ROEL Arambula. Care transferred.

## 2025-05-16 NOTE — PROGRESS NOTES
"29 y.o. -0-3-1 here to LDA 3 with FOB Joao expecting a baby girl \"Cece\" sent over from Yelitza's office. Pt had a f/u growth/position scan today and she appeared to have decreased amniotic fluid. Pt denies vaginal bleeding of leaking of fluid, denies contractions. Reports positive fetal movement.   EFM & [place] applied. VSS, Report to Dr. Haynes, order for CHIARA, & amnisure.   SSE no pooling of amniotic fluid noted in vaginal vault.   Amnisure collected. CHIARA ordered.   "

## 2025-05-16 NOTE — PROGRESS NOTES
1235- Report received from ROEL Smith. POC discussed and assumed. EFM and TOCO in place and reading well.     1350- Dr Durand at bedside and talked with pt regarding current CHIARA and discharge home. Orders to discharge pt home with close follow up in the office. Pt agrees with plan.     1400- PIV removed and pressure gauze applied.     1423- Discharge instructions provided with pre-term labor precautions. All questions answered. Pt discharged home with her  and son.

## 2025-05-16 NOTE — H&P
Obstetrics History and Physical    CC: oligohydramnios    HPI:  Ms. Gm Riley is a 29 y.o.  @ 36w0d by 6wk US sent from the office with oligohydramnios to rule out ROM as well as reevaluate amniotic fluid.    Contractions: no  Loss of fluid: no  Vaginal bleeding: no  Fetal movement: yes    She had growth US at 32 wks that showed EFW 52%, AC 83%, CHIARA 6.05cm (low normal).  CHIARA was repeated at next visit and 9.8cm (normal).  Today she had routine 36 wk visit and fluid was checked again. Only 1 pocket of fluid was found by Dr. Torre, which measured 2.7cm. She was then sent to L&D for further evaluation.    CBC and ferritin were done in the office 2 days ago and normal.    Pregnancy complications:  Oligohydramnios as above  Asthma  Marginal cord insertion    PNC with [facility], Dr Torre    PNL:  Rh pos, RI, HIV neg, RPR NR, HBsAg NR, GC/CT neg/neg  1hr glucola 151. Did fingersticks in lieu of 3hr GTT but those results not available to me  GBS unknown      OB History    Para Term  AB Living   5 1 1 0 3 1   SAB IAB Ectopic Molar Multiple Live Births    1          # Outcome Date GA Lbr Dino/2nd Weight Sex Type Anes PTL Lv   5 Current            4 IAB 2013 6w0d             Birth Comments: Denies complications   3 AB            2 AB            1 Term                GYN: denies STIs, no cervical procedures    Past Medical History[1]    Past Surgical History[2]    Allergies[3]    Prior to Admission Medications   Prescriptions Last Dose Informant Patient Reported? Taking?   albuterol 108 (90 Base) MCG/ACT Aero Soln inhalation aerosol   No No   Sig: Inhale 2 Puffs every four hours as needed for Shortness of Breath.   erythromycin 5 MG/GM Ointment   No No   Sig: Apply 1 Application to right eye at bedtime.      Facility-Administered Medications: None       Family History   Problem Relation Age of Onset    Migraines Mother     Arthritis Mother     Other Mother         Trigeminal neuralgia     "Arthritis Father     Asthma Brother     Hypertension Maternal Grandmother     Ovarian Cancer Maternal Grandmother     Hyperlipidemia Maternal Grandmother     Diabetes Maternal Grandmother     Bilateral Breast Cancer Neg Hx     Colorectal Cancer Neg Hx        Social History[4]    PE:  Vitals:    05/15/25 1625 05/15/25 2000   BP: 128/72 118/67   Pulse: 97 87   Resp: 16 16   Temp: 36.8 °C (98.3 °F) 36 °C (96.8 °F)   TempSrc: Temporal Temporal   SpO2: 96% 95%   Weight: 93 kg (205 lb)    Height: 1.651 m (5' 5\")      gen: AAO, NAD  abd: soft, gravid, NT,  Ext: NT, no edema    Sterile Vaginal Exam: deferred  Fetal Heart Tracing: Baseline Rate: 125 bpm Variability: 6-25 Accelerations: Present Decels: Absent Mode: External US    [place]: Mode: External [place] Contraction Frequency: 0     Latest Reference Range & Units 05/15/25 16:45   AmniSure ROM Negative  Negative     US done here: Fetal Lie:  Vertex     Clinical JOSÉ by LMP:  2025     Placenta (Location):  Anterior right  Placenta Previa: No  Placental ndGndrndanddndend:nd nd2nd Amniotic Fluid Volume:  CHIARA = 4.99 cm     Fetal Heart Rate:  133 bpm     Cervical Length:  3.65 cm     No maternal adnexal mass is identified.    A/P: 29 y.o.  @ 36w0d by 6wk US with oligohydramnios, intact membranes  -Discussed with patient options of proceeding with IOL or in house observation. Reviewed paucity of data regarding timing of delivery for idiopathic oligo. ACOG suggests between 36+0 and 37+6. Other experts suggest 36 if there are other complications, 37 if maternal/fetal status are stable. Earlier delivery may avoid some maternal/fetal morbidity but at the risk of increased respiratory distress of the . If observation is elected, then I would recommend IV fluid hydration and continuous monitoring, with recheck of fluid tomorrow. The patient would like to be observed, receive IV hydration, and recheck fluid tomorrow.  -IV bolus 1L LR, followed by 125mL/hr  -Check growth US  -Amnisure " neg. Advised pt to alert staff if she feels any leaking that could suggest ROM.  -Collect GBS    Albertina Haynes MD  [facility]           [1]   Past Medical History:  Diagnosis Date    Anxiety 02/26/2021    ASTHMA     exercised induced    Recurrent epigastric abdominal pain 02/26/2021   [2] No past surgical history on file.  [3]   Allergies  Allergen Reactions    Shellfish-Derived Products Anaphylaxis    Cat Hair Extract Itching and Shortness of Breath    Other Environmental Runny Nose     Seasonal   [4]   Social History  Socioeconomic History    Marital status: Other    Highest education level: Some college, no degree   Tobacco Use    Smoking status: Never    Smokeless tobacco: Never   Vaping Use    Vaping status: Never Used   Substance and Sexual Activity    Alcohol use: Not Currently    Drug use: Never    Sexual activity: Yes     Partners: Male     Social Drivers of Health     Financial Resource Strain: Medium Risk (9/15/2024)    Overall Financial Resource Strain (CARDIA)     Difficulty of Paying Living Expenses: Somewhat hard   Food Insecurity: No Food Insecurity (9/15/2024)    Hunger Vital Sign     Worried About Running Out of Food in the Last Year: Never true     Ran Out of Food in the Last Year: Never true   Transportation Needs: No Transportation Needs (9/15/2024)    PRAPARE - Transportation     Lack of Transportation (Medical): No     Lack of Transportation (Non-Medical): No   Physical Activity: Insufficiently Active (9/15/2024)    Exercise Vital Sign     Days of Exercise per Week: 2 days     Minutes of Exercise per Session: 20 min   Stress: Stress Concern Present (9/15/2024)    [facility] [employer] - Occupational Stress Questionnaire     Feeling of Stress : To some extent   Social Connections: Moderately Isolated (9/15/2024)    Social Connection and Isolation Panel [NHANES]     Frequency of Communication with Friends and Family: More than three times a week     Frequency of  Social Gatherings with Friends and Family: Twice a week     Attends Scientologist Services: Never     Active Member of Clubs or Organizations: No     Attends Club or Organization Meetings: Patient declined     Marital Status: Living with partner   Housing Stability: Low Risk  (9/15/2024)    Housing Stability Vital Sign     Unable to Pay for Housing in the Last Year: No     Number of Times Moved in the Last Year: 0     Homeless in the Last Year: No

## 2025-06-06 NOTE — H&P
Labor and Delivery History and Physical    CC: Here for induction of labor    HPI: Gm Riley is a 29-year-old G4, P1 who presents today at 39 weeks and 2 days for term induction of labor.  She received her prenatal care with myself this pregnancy.  She had NIPT demonstrating a chromosome structure 46XX.  She was referred to [facility] for her anatomic scan.  It was performed in the second trimester and marginal cord insertion was identified but otherwise was unremarkable.  She had follow-up biometry at 32 weeks demonstrating an EFW in the 52nd percentile at 4 pound 6 ounces.  At that point her CHIARA appeared subjectively decreased.  At her 36-week appointments her infant was noted to be vertex and the CHIARA was markedly decreased with no deep vertical pockets greater than 2 cm.  She was sent to labor and delivery for evaluation for rupture membranes and a repeat CHIARA.  She was not ruptured and the CHIARA repeat was within normal limits.  Since then she has been receiving twice weekly nonstress testing and checking her CHIARA.  She has persistently had a single DVP that is 5 cm with each visit.    ROS: All other systems were reviewed and were found to be negative    PMH: Asthma, depression, migraines, stomach problems  PSH: None  OB Hx: Term  of a viable male infant followed by 3 first trimester pregnancy losses  GYN Hx: She denies history of sexually transmitted infection including herpes simplex virus for herself or her spouse.  She denies abnormal Pap smears.  Her last Pap was NILM in   FH: Diabetes, hyperlipidemia, ovarian cancer, hypertension, mental illness  SH: Denies tobacco, alcohol, or illicit drug use  Medications: Prenatal vitamins  Allergies: Shellfish    Physical exam in office on 2025  /66  Weight 213 pounds  General: No apparent distress  Abdomen: Gravid, nontender  Extremities: No edema    Pertinent lab results  ABO O+  GBS negative  GTT 1 hour 151.  She checked her fasting and postprandial  glucose values for 2 weeks and the majority were largely within normal limits  HIV negative  Hep B-  Hep C negative  RPR negative  Rubella immune    Assessment:  Term IUP at 39 weeks 2 days    Plan:  Admit to labor and delivery for induction of labor with Pitocin and artificial rupture membranes.  She may have an epidural on demand.  Anticipate vaginal delivery.    Ricarda Torre M.D.

## 2025-06-07 ENCOUNTER — APPOINTMENT (OUTPATIENT)
Dept: OBGYN | Facility: MEDICAL CENTER | Age: 30
End: 2025-06-07
Attending: OBSTETRICS & GYNECOLOGY
Payer: COMMERCIAL

## 2025-06-07 ENCOUNTER — ANESTHESIA EVENT (OUTPATIENT)
Dept: ANESTHESIOLOGY | Facility: MEDICAL CENTER | Age: 30
End: 2025-06-07
Payer: COMMERCIAL

## 2025-06-07 ENCOUNTER — HOSPITAL ENCOUNTER (INPATIENT)
Facility: MEDICAL CENTER | Age: 30
LOS: 1 days | End: 2025-06-08
Attending: OBSTETRICS & GYNECOLOGY | Admitting: OBSTETRICS & GYNECOLOGY
Payer: COMMERCIAL

## 2025-06-07 ENCOUNTER — ANESTHESIA (OUTPATIENT)
Dept: ANESTHESIOLOGY | Facility: MEDICAL CENTER | Age: 30
End: 2025-06-07
Payer: COMMERCIAL

## 2025-06-07 DIAGNOSIS — Z03.71: Primary | ICD-10-CM

## 2025-06-07 LAB
BASOPHILS # BLD AUTO: 0.3 % (ref 0–1.8)
BASOPHILS # BLD: 0.02 K/UL (ref 0–0.12)
EOSINOPHIL # BLD AUTO: 0.02 K/UL (ref 0–0.51)
EOSINOPHIL NFR BLD: 0.3 % (ref 0–6.9)
ERYTHROCYTE [DISTWIDTH] IN BLOOD BY AUTOMATED COUNT: 63.6 FL (ref 35.9–50)
HCT VFR BLD AUTO: 36.9 % (ref 37–47)
HGB BLD-MCNC: 12.4 G/DL (ref 12–16)
HOLDING TUBE BB 8507: NORMAL
IMM GRANULOCYTES # BLD AUTO: 0.05 K/UL (ref 0–0.11)
IMM GRANULOCYTES NFR BLD AUTO: 0.8 % (ref 0–0.9)
LYMPHOCYTES # BLD AUTO: 1.61 K/UL (ref 1–4.8)
LYMPHOCYTES NFR BLD: 26.5 % (ref 22–41)
MCH RBC QN AUTO: 29.9 PG (ref 27–33)
MCHC RBC AUTO-ENTMCNC: 33.6 G/DL (ref 32.2–35.5)
MCV RBC AUTO: 88.9 FL (ref 81.4–97.8)
MONOCYTES # BLD AUTO: 0.3 K/UL (ref 0–0.85)
MONOCYTES NFR BLD AUTO: 4.9 % (ref 0–13.4)
NEUTROPHILS # BLD AUTO: 4.07 K/UL (ref 1.82–7.42)
NEUTROPHILS NFR BLD: 67.2 % (ref 44–72)
NRBC # BLD AUTO: 0 K/UL
NRBC BLD-RTO: 0 /100 WBC (ref 0–0.2)
PLATELET # BLD AUTO: 294 K/UL (ref 164–446)
PMV BLD AUTO: 10.4 FL (ref 9–12.9)
RBC # BLD AUTO: 4.15 M/UL (ref 4.2–5.4)
T PALLIDUM AB SER QL IA: NORMAL
WBC # BLD AUTO: 6.1 K/UL (ref 4.8–10.8)

## 2025-06-07 PROCEDURE — 770002 HCHG ROOM/CARE - OB PRIVATE (112)

## 2025-06-07 PROCEDURE — 700105 HCHG RX REV CODE 258: Performed by: OBSTETRICS & GYNECOLOGY

## 2025-06-07 PROCEDURE — A9270 NON-COVERED ITEM OR SERVICE: HCPCS | Performed by: OBSTETRICS & GYNECOLOGY

## 2025-06-07 PROCEDURE — 700111 HCHG RX REV CODE 636 W/ 250 OVERRIDE (IP): Performed by: STUDENT IN AN ORGANIZED HEALTH CARE EDUCATION/TRAINING PROGRAM

## 2025-06-07 PROCEDURE — 36415 COLL VENOUS BLD VENIPUNCTURE: CPT

## 2025-06-07 PROCEDURE — 59409 OBSTETRICAL CARE: CPT

## 2025-06-07 PROCEDURE — 303615 HCHG EPIDURAL/SPINAL ANESTHESIA FOR LABOR

## 2025-06-07 PROCEDURE — 700111 HCHG RX REV CODE 636 W/ 250 OVERRIDE (IP): Mod: JZ | Performed by: OBSTETRICS & GYNECOLOGY

## 2025-06-07 PROCEDURE — 304965 HCHG RECOVERY SERVICES

## 2025-06-07 PROCEDURE — 700102 HCHG RX REV CODE 250 W/ 637 OVERRIDE(OP): Performed by: OBSTETRICS & GYNECOLOGY

## 2025-06-07 PROCEDURE — 86780 TREPONEMA PALLIDUM: CPT

## 2025-06-07 PROCEDURE — RXMED WILLOW AMBULATORY MEDICATION CHARGE: Performed by: OBSTETRICS & GYNECOLOGY

## 2025-06-07 PROCEDURE — 700101 HCHG RX REV CODE 250: Performed by: STUDENT IN AN ORGANIZED HEALTH CARE EDUCATION/TRAINING PROGRAM

## 2025-06-07 PROCEDURE — 0UQMXZZ REPAIR VULVA, EXTERNAL APPROACH: ICD-10-PCS | Performed by: OBSTETRICS & GYNECOLOGY

## 2025-06-07 PROCEDURE — 85025 COMPLETE CBC W/AUTO DIFF WBC: CPT

## 2025-06-07 RX ORDER — LIDOCAINE HYDROCHLORIDE AND EPINEPHRINE 15; 5 MG/ML; UG/ML
INJECTION, SOLUTION EPIDURAL
Status: COMPLETED | OUTPATIENT
Start: 2025-06-07 | End: 2025-06-07

## 2025-06-07 RX ORDER — ACETAMINOPHEN 500 MG
1000 TABLET ORAL
Status: DISCONTINUED | OUTPATIENT
Start: 2025-06-07 | End: 2025-06-07 | Stop reason: HOSPADM

## 2025-06-07 RX ORDER — ACETAMINOPHEN 500 MG
1000 TABLET ORAL EVERY 6 HOURS PRN
Status: DISCONTINUED | OUTPATIENT
Start: 2025-06-07 | End: 2025-06-08 | Stop reason: HOSPADM

## 2025-06-07 RX ORDER — ONDANSETRON 4 MG/1
4 TABLET, ORALLY DISINTEGRATING ORAL EVERY 6 HOURS PRN
Status: DISCONTINUED | OUTPATIENT
Start: 2025-06-07 | End: 2025-06-07 | Stop reason: HOSPADM

## 2025-06-07 RX ORDER — ROPIVACAINE HYDROCHLORIDE 2 MG/ML
INJECTION, SOLUTION EPIDURAL; INFILTRATION; PERINEURAL CONTINUOUS
Status: DISCONTINUED | OUTPATIENT
Start: 2025-06-07 | End: 2025-06-07

## 2025-06-07 RX ORDER — IBUPROFEN 800 MG/1
800 TABLET, FILM COATED ORAL EVERY 8 HOURS PRN
Status: DISCONTINUED | OUTPATIENT
Start: 2025-06-07 | End: 2025-06-08 | Stop reason: HOSPADM

## 2025-06-07 RX ORDER — SODIUM CHLORIDE, SODIUM LACTATE, POTASSIUM CHLORIDE, CALCIUM CHLORIDE 600; 310; 30; 20 MG/100ML; MG/100ML; MG/100ML; MG/100ML
INJECTION, SOLUTION INTRAVENOUS CONTINUOUS
Status: DISCONTINUED | OUTPATIENT
Start: 2025-06-07 | End: 2025-06-07

## 2025-06-07 RX ORDER — TERBUTALINE SULFATE 1 MG/ML
0.25 INJECTION SUBCUTANEOUS
Status: DISCONTINUED | OUTPATIENT
Start: 2025-06-07 | End: 2025-06-07 | Stop reason: HOSPADM

## 2025-06-07 RX ORDER — ACETAMINOPHEN 500 MG
1000 TABLET ORAL
Qty: 30 TABLET | Refills: 0 | Status: SHIPPED | OUTPATIENT
Start: 2025-06-07

## 2025-06-07 RX ORDER — SODIUM CHLORIDE, SODIUM LACTATE, POTASSIUM CHLORIDE, AND CALCIUM CHLORIDE .6; .31; .03; .02 G/100ML; G/100ML; G/100ML; G/100ML
1000 INJECTION, SOLUTION INTRAVENOUS
Status: DISCONTINUED | OUTPATIENT
Start: 2025-06-07 | End: 2025-06-07 | Stop reason: HOSPADM

## 2025-06-07 RX ORDER — SODIUM CHLORIDE, SODIUM LACTATE, POTASSIUM CHLORIDE, AND CALCIUM CHLORIDE .6; .31; .03; .02 G/100ML; G/100ML; G/100ML; G/100ML
250 INJECTION, SOLUTION INTRAVENOUS PRN
Status: DISCONTINUED | OUTPATIENT
Start: 2025-06-07 | End: 2025-06-07 | Stop reason: HOSPADM

## 2025-06-07 RX ORDER — ALUMINA, MAGNESIA, AND SIMETHICONE 2400; 2400; 240 MG/30ML; MG/30ML; MG/30ML
30 SUSPENSION ORAL EVERY 6 HOURS PRN
Status: DISCONTINUED | OUTPATIENT
Start: 2025-06-07 | End: 2025-06-07 | Stop reason: HOSPADM

## 2025-06-07 RX ORDER — IBUPROFEN 800 MG/1
800 TABLET, FILM COATED ORAL
Qty: 30 TABLET | Refills: 1 | Status: SHIPPED | OUTPATIENT
Start: 2025-06-07

## 2025-06-07 RX ORDER — SODIUM CHLORIDE, SODIUM LACTATE, POTASSIUM CHLORIDE, CALCIUM CHLORIDE 600; 310; 30; 20 MG/100ML; MG/100ML; MG/100ML; MG/100ML
2000 INJECTION, SOLUTION INTRAVENOUS PRN
Status: DISCONTINUED | OUTPATIENT
Start: 2025-06-07 | End: 2025-06-08 | Stop reason: HOSPADM

## 2025-06-07 RX ORDER — DOCUSATE SODIUM 100 MG/1
100 CAPSULE, LIQUID FILLED ORAL 2 TIMES DAILY PRN
Status: DISCONTINUED | OUTPATIENT
Start: 2025-06-07 | End: 2025-06-08 | Stop reason: HOSPADM

## 2025-06-07 RX ORDER — OXYTOCIN 10 [USP'U]/ML
10 INJECTION, SOLUTION INTRAMUSCULAR; INTRAVENOUS
Status: DISCONTINUED | OUTPATIENT
Start: 2025-06-07 | End: 2025-06-07 | Stop reason: HOSPADM

## 2025-06-07 RX ORDER — ONDANSETRON 2 MG/ML
4 INJECTION INTRAMUSCULAR; INTRAVENOUS EVERY 6 HOURS PRN
Status: DISCONTINUED | OUTPATIENT
Start: 2025-06-07 | End: 2025-06-07 | Stop reason: HOSPADM

## 2025-06-07 RX ORDER — BUPIVACAINE HYDROCHLORIDE 2.5 MG/ML
INJECTION, SOLUTION EPIDURAL; INFILTRATION; INTRACAUDAL; PERINEURAL
Status: COMPLETED | OUTPATIENT
Start: 2025-06-07 | End: 2025-06-07

## 2025-06-07 RX ORDER — LIDOCAINE HYDROCHLORIDE 10 MG/ML
20 INJECTION, SOLUTION INFILTRATION; PERINEURAL
Status: DISCONTINUED | OUTPATIENT
Start: 2025-06-07 | End: 2025-06-07 | Stop reason: HOSPADM

## 2025-06-07 RX ORDER — EPHEDRINE SULFATE 50 MG/ML
5 INJECTION, SOLUTION INTRAVENOUS
Status: DISCONTINUED | OUTPATIENT
Start: 2025-06-07 | End: 2025-06-07 | Stop reason: HOSPADM

## 2025-06-07 RX ORDER — IBUPROFEN 800 MG/1
800 TABLET, FILM COATED ORAL
Status: DISCONTINUED | OUTPATIENT
Start: 2025-06-07 | End: 2025-06-07 | Stop reason: HOSPADM

## 2025-06-07 RX ADMIN — OXYTOCIN 2 MILLI-UNITS/MIN: 10 INJECTION, SOLUTION INTRAMUSCULAR; INTRAVENOUS at 09:03

## 2025-06-07 RX ADMIN — OXYTOCIN 20 UNITS: 10 INJECTION, SOLUTION INTRAMUSCULAR; INTRAVENOUS at 17:30

## 2025-06-07 RX ADMIN — OXYTOCIN 125 ML/HR: 10 INJECTION, SOLUTION INTRAMUSCULAR; INTRAVENOUS at 19:21

## 2025-06-07 RX ADMIN — ACETAMINOPHEN 1000 MG: 500 TABLET ORAL at 22:38

## 2025-06-07 RX ADMIN — SODIUM CHLORIDE, POTASSIUM CHLORIDE, SODIUM LACTATE AND CALCIUM CHLORIDE: 600; 310; 30; 20 INJECTION, SOLUTION INTRAVENOUS at 09:03

## 2025-06-07 RX ADMIN — BUPIVACAINE HYDROCHLORIDE 5 ML: 2.5 INJECTION, SOLUTION EPIDURAL; INFILTRATION; INTRACAUDAL at 14:54

## 2025-06-07 RX ADMIN — LIDOCAINE HYDROCHLORIDE,EPINEPHRINE BITARTRATE 3 ML: 15; .005 INJECTION, SOLUTION EPIDURAL; INFILTRATION; INTRACAUDAL; PERINEURAL at 14:51

## 2025-06-07 RX ADMIN — BUPIVACAINE HYDROCHLORIDE 5 ML: 2.5 INJECTION, SOLUTION EPIDURAL; INFILTRATION; INTRACAUDAL at 14:59

## 2025-06-07 RX ADMIN — ROPIVACAINE HYDROCHLORIDE: 2 INJECTION, SOLUTION EPIDURAL; INFILTRATION; PERINEURAL at 14:53

## 2025-06-07 RX ADMIN — EPHEDRINE SULFATE 5 MG: 50 INJECTION, SOLUTION INTRAVENOUS at 16:36

## 2025-06-07 ASSESSMENT — SOCIAL DETERMINANTS OF HEALTH (SDOH)
WITHIN THE LAST YEAR, HAVE TO BEEN RAPED OR FORCED TO HAVE ANY KIND OF SEXUAL ACTIVITY BY YOUR PARTNER OR EX-PARTNER?: NO
IN THE PAST 12 MONTHS, HAS THE ELECTRIC, GAS, OIL, OR WATER COMPANY THREATENED TO SHUT OFF SERVICE IN YOUR HOME?: NO
WITHIN THE LAST YEAR, HAVE YOU BEEN AFRAID OF YOUR PARTNER OR EX-PARTNER?: NO
WITHIN THE PAST 12 MONTHS, THE FOOD YOU BOUGHT JUST DIDN'T LAST AND YOU DIDN'T HAVE MONEY TO GET MORE: NEVER TRUE
WITHIN THE LAST YEAR, HAVE YOU BEEN HUMILIATED OR EMOTIONALLY ABUSED IN OTHER WAYS BY YOUR PARTNER OR EX-PARTNER?: NO
WITHIN THE PAST 12 MONTHS, YOU WORRIED THAT YOUR FOOD WOULD RUN OUT BEFORE YOU GOT THE MONEY TO BUY MORE: NEVER TRUE
WITHIN THE LAST YEAR, HAVE YOU BEEN KICKED, HIT, SLAPPED, OR OTHERWISE PHYSICALLY HURT BY YOUR PARTNER OR EX-PARTNER?: NO

## 2025-06-07 ASSESSMENT — PATIENT HEALTH QUESTIONNAIRE - PHQ9
1. LITTLE INTEREST OR PLEASURE IN DOING THINGS: NOT AT ALL
SUM OF ALL RESPONSES TO PHQ9 QUESTIONS 1 AND 2: 0
2. FEELING DOWN, DEPRESSED, IRRITABLE, OR HOPELESS: NOT AT ALL

## 2025-06-07 ASSESSMENT — PAIN SCALES - GENERAL
PAIN_LEVEL: 0
PAINLEVEL: 0 - NO PAIN

## 2025-06-07 ASSESSMENT — FIBROSIS 4 INDEX: FIB4 SCORE: 0.21

## 2025-06-07 ASSESSMENT — LIFESTYLE VARIABLES: ALCOHOL_USE: NO

## 2025-06-07 ASSESSMENT — PAIN DESCRIPTION - PAIN TYPE
TYPE: ACUTE PAIN
TYPE: ACUTE PAIN

## 2025-06-07 NOTE — PROGRESS NOTES
0729: Pt is a  at 39.2 weeks today, pt presents to L&D for scheduled IOL. Pt reports +FM, denies VB/LOF/Uc's at this time. EFM and TOCO applied, VS taken, pt comfortable in bed at this time.    0800: SVE as seen in flowsheets.    0806: This RN telephoned Dr Carroll, updated on pt status/VS/FHT/SVE. Orders received, see orders for details.     1440: This RN and Dr Copeland at bedside for epidural placement, timeout completed, all agreed.     1600: This RN and Dr Carroll at bedside, SVE as seen in flowsheets.     1608: Pt called out reporting LOF, pooling of fluid noted, see flowsheets for details.     1658: Pt called out reporting increased pressure, SVE as seen in flowsheets. This RN updated Dr Carroll on pt status/VS/FHT/SVE.     1717: Pt reporting urge to push, SVE as seen in flowsheets. This RN telephoned Dr Carroll, requested at bedside for delivery.    1725:  viable female infant, APGARS 8/9.    1855: Bedside report given to Constantin SEVILLA, POC discussed.

## 2025-06-07 NOTE — ANESTHESIA PREPROCEDURE EVALUATION
Date: 25  Procedure: Labor Epidural         Relevant Problems   No relevant active problems     30 yo  @ 39+2 requesting labor epidural.  .  No signs or symptoms of preeclampsia.  Hx scoliosis with previous patchy epidural.    Physical Exam    Airway   Mallampati: II  TM distance: >3 FB  Neck ROM: full       Cardiovascular - normal exam  Rhythm: regular  Rate: normal    (-) murmur     Dental - normal exam           Pulmonary - normal examBreath sounds clear to auscultation     Abdominal    Neurological - normal exam                 Anesthesia Plan    ASA 2       Plan - epidural   Neuraxial block will be labor analgesia            Induction: intravenous    Postoperative Plan: Postoperative administration of opioids is intended.    Pertinent diagnostic labs and testing reviewed    Informed Consent:    Anesthetic plan and risks discussed with patient.    Use of blood products discussed with: patient whom consented to blood products.

## 2025-06-07 NOTE — CARE PLAN
The patient is Stable - Low risk of patient condition declining or worsening    Shift Goals  Clinical Goals: cervical dilation and effacement  Patient Goals: safe delivery  Family Goals: emotional support    Progress made toward(s) clinical / shift goals:      Problem: Risk for Infection and Impaired Wound Healing  Goal: Patient will remain free from infection  Outcome: Progressing  Note: Pt will remain afebrile, and will show no s/s of infection.     Problem: Pain  Goal: Patient's pain will be alleviated or reduced to the patient’s comfort goal  Outcome: Progressing  Note: Pt continuously monitored for pain, educated on pain management options. Call light within reach, pt understands to call for RN regarding any needs or concerns.

## 2025-06-07 NOTE — ANESTHESIA PROCEDURE NOTES
Epidural Block    Date/Time: 6/7/2025 2:50 PM    Performed by: Gloria Copeland M.D.  Authorized by: Gloria Copeland M.D.    Patient Location:  OB  Start Time:  6/7/2025 2:50 PM  Reason for Block: labor analgesia    patient identified, IV checked, site marked, risks and benefits discussed, surgical consent, monitors and equipment checked and pre-op evaluation    Patient Position:  Sitting  Prep: ChloraPrep, patient draped and sterile technique    Monitoring:  Blood pressure, continuous pulse oximetry and heart rate  Approach:  Midline  Location:  L2-L3  Injection Technique:  SHANE saline  Skin infiltration:  Lidocaine  Strength:  1%  Dose:  3ml  Needle Type:  Tuohy  Needle Gauge:  17 G  Needle Length:  3.5 in  Loss of resistance::  5.5  Catheter Size:  19 G  Catheter at Skin Depth:  10.5  Test Dose Result:  Negative   First pass DPE, intentional dural puncture made with 25g pencil point spinal needle with return of clear CSF.  No heme or paresthesias.  No apparent complications.  Patient tolerated procedure well.

## 2025-06-08 ENCOUNTER — PHARMACY VISIT (OUTPATIENT)
Dept: PHARMACY | Facility: MEDICAL CENTER | Age: 30
End: 2025-06-08
Payer: COMMERCIAL

## 2025-06-08 VITALS
HEIGHT: 65 IN | RESPIRATION RATE: 18 BRPM | BODY MASS INDEX: 34.16 KG/M2 | SYSTOLIC BLOOD PRESSURE: 104 MMHG | WEIGHT: 205 LBS | TEMPERATURE: 97.9 F | HEART RATE: 95 BPM | DIASTOLIC BLOOD PRESSURE: 58 MMHG | OXYGEN SATURATION: 97 %

## 2025-06-08 LAB
ERYTHROCYTE [DISTWIDTH] IN BLOOD BY AUTOMATED COUNT: 63.7 FL (ref 35.9–50)
HCT VFR BLD AUTO: 35.1 % (ref 37–47)
HGB BLD-MCNC: 11.5 G/DL (ref 12–16)
MCH RBC QN AUTO: 29.7 PG (ref 27–33)
MCHC RBC AUTO-ENTMCNC: 32.8 G/DL (ref 32.2–35.5)
MCV RBC AUTO: 90.7 FL (ref 81.4–97.8)
PLATELET # BLD AUTO: 260 K/UL (ref 164–446)
PMV BLD AUTO: 10.4 FL (ref 9–12.9)
RBC # BLD AUTO: 3.87 M/UL (ref 4.2–5.4)
WBC # BLD AUTO: 8.6 K/UL (ref 4.8–10.8)

## 2025-06-08 PROCEDURE — 700102 HCHG RX REV CODE 250 W/ 637 OVERRIDE(OP): Performed by: OBSTETRICS & GYNECOLOGY

## 2025-06-08 PROCEDURE — A9270 NON-COVERED ITEM OR SERVICE: HCPCS | Performed by: OBSTETRICS & GYNECOLOGY

## 2025-06-08 PROCEDURE — 85027 COMPLETE CBC AUTOMATED: CPT

## 2025-06-08 PROCEDURE — 36415 COLL VENOUS BLD VENIPUNCTURE: CPT

## 2025-06-08 RX ADMIN — IBUPROFEN 800 MG: 800 TABLET, FILM COATED ORAL at 05:55

## 2025-06-08 RX ADMIN — ACETAMINOPHEN 1000 MG: 500 TABLET ORAL at 16:26

## 2025-06-08 RX ADMIN — ACETAMINOPHEN 1000 MG: 500 TABLET ORAL at 09:50

## 2025-06-08 RX ADMIN — IBUPROFEN 800 MG: 800 TABLET, FILM COATED ORAL at 14:42

## 2025-06-08 ASSESSMENT — EDINBURGH POSTNATAL DEPRESSION SCALE (EPDS)
I HAVE BLAMED MYSELF UNNECESSARILY WHEN THINGS WENT WRONG: NO, NEVER
I HAVE BEEN ABLE TO LAUGH AND SEE THE FUNNY SIDE OF THINGS: AS MUCH AS I ALWAYS COULD
I HAVE BEEN ANXIOUS OR WORRIED FOR NO GOOD REASON: NO, NOT AT ALL
I HAVE LOOKED FORWARD WITH ENJOYMENT TO THINGS: AS MUCH AS I EVER DID
THE THOUGHT OF HARMING MYSELF HAS OCCURRED TO ME: NEVER
THINGS HAVE BEEN GETTING ON TOP OF ME: NO, MOST OF THE TIME I HAVE COPED QUITE WELL
I HAVE FELT SCARED OR PANICKY FOR NO GOOD REASON: NO, NOT AT ALL
I HAVE BEEN SO UNHAPPY THAT I HAVE BEEN CRYING: NO, NEVER
I HAVE BEEN SO UNHAPPY THAT I HAVE HAD DIFFICULTY SLEEPING: NOT AT ALL
I HAVE FELT SAD OR MISERABLE: NO, NOT AT ALL

## 2025-06-08 ASSESSMENT — PAIN DESCRIPTION - PAIN TYPE
TYPE: ACUTE PAIN

## 2025-06-08 NOTE — CARE PLAN
The patient is Stable - Low risk of patient condition declining or worsening    Shift Goals  Clinical Goals: fundus firm, lochia light, vitals WNL  Patient Goals: rest, bond with infant  Family Goals: supportive care    Progress made toward(s) clinical / shift goals:    Problem: Altered physiologic condition related to immediate post-delivery state and potential for bleeding/hemorrhage  Goal: Patient physiologically stable as evidenced by normal lochia, palpable uterine involution and vital signs within normal limits  Outcome: Progressing  Note: Fundus firm, lochia light. Vitals WNL.      Problem: Potential for postpartum infection related to presence of episiotomy/vaginal tear and/or uterine contamination  Goal: Patient will be absent from signs and symptoms of infection  Outcome: Progressing  Note: Has periuretheral laceration. Tucks, spray and ice in place. Reports comfort.      Problem: Alteration in comfort related to episiotomy, vaginal repair and/or after birth pains  Goal: Patient is able to ambulate, care for self and infant  Outcome: Progressing  Note: Ambulatory self. Bonding with infant and father of baby in room appropriately.        Patient is not progressing towards the following goals:

## 2025-06-08 NOTE — PROGRESS NOTES
New admit at 2100 from L&D. Mom is holding infant. Pt a&o x4. Voided prior to admit on the floor. Reports adequate pain management. Fundus firm, lochia light. Plan of care reviewed including: diet, vitals frequency, fall precautions, emergency and non-emergent call light system, visiting hours, infant feeding frequency, infant care and mom care, labs and medications. Verbalized understanding and agrees. Able to make needs known.    Feeding Plan: breastfeeding  Incision care/ wound care: periurethral laceration repaired. Tucks/spray and ice given for comfort.

## 2025-06-08 NOTE — DISCHARGE SUMMARY
Discharge Summary:      Gm Riley    Admit Date:   2025  Discharge Date:  2025     Admitting diagnosis:  Indication for care in labor or delivery [O75.9]  Discharge Diagnosis: Status post vaginal, spontaneous.  Pregnancy Complications: none  Tubal Ligation:  no        History:  Past Medical History[1]  OB History    Para Term  AB Living   5 2 2 0 3 2   SAB IAB Ectopic Molar Multiple Live Births    1   0 1      # Outcome Date GA Lbr Dino/2nd Weight Sex Type Anes PTL Lv   5 Term 25 39w2d / 00:08 2.9 kg (6 lb 6.3 oz) F Vag-Spont EPI N LINA   4 IAB  6w0d             Birth Comments: Denies complications   3 AB            2 AB            1 Term                 Shellfish-derived products, Cat hair extract, and Other environmental  Patient Active Problem List    Diagnosis Date Noted    Admission for observation of suspected oligohydramnios not found 2025    Prediabetes 2024    History of recurrent miscarriages 2024    Toenail fungus 2022    Anxiety 2021    Dysmenorrhea 2021    Exercise-induced asthma 2021    Chronic joint pain 2021    Chronic fatigue 2021        Hospital Course:   29 y.o. , now para 2, was admitted with the above mentioned diagnosis, underwent Induction of Labor, vaginal, spontaneous. Patient postpartum course was unremarkable, with progressive advancement in diet , ambulation and toleration of oral analgesia. Patient without complaints today and desires discharge.      Vitals:    25 1958 25 2100 25 0200 25 0600   BP: 122/60 129/79 133/80 104/58   Pulse: 97 97 81 95   Resp:  18 16 18   Temp:  36.9 °C (98.4 °F) 36.4 °C (97.5 °F) 36.6 °C (97.9 °F)   TempSrc:  Temporal Temporal Temporal   SpO2:  97% 99% 97%   Weight:       Height:           Current Facility-Administered Medications   Medication Dose    oxytocin (Pitocin) infusion (for post delivery)  125 mL/hr    lactated ringers infusion   2,000 mL    docusate sodium (Colace) capsule 100 mg  100 mg    ibuprofen (Motrin) tablet 800 mg  800 mg    acetaminophen (Tylenol) tablet 1,000 mg  1,000 mg       Exam:  Exam:  Gen: A&Ox3 NAD  CV: no edema or cyanosis  Resp: normal effort  Abd: soft, NT, FF and below U  Ext: no edema or TTP  Psych: normal mood and affect\       Labs:  Recent Labs     06/07/25  0800 06/08/25  0516   WBC 6.1 8.6   RBC 4.15* 3.87*   HEMOGLOBIN 12.4 11.5*   HEMATOCRIT 36.9* 35.1*   MCV 88.9 90.7   MCH 29.9 29.7   MCHC 33.6 32.8   RDW 63.6* 63.7*   PLATELETCT 294 260   MPV 10.4 10.4        Activity:   Discharge to home  Pelvic Rest x 6 weeks    Assessment:  normal postpartum course  Discharge Assessment: stable for discharge     Follow up: Dr. Torre in 6 weeks     Discharge Meds:   Current Outpatient Medications   Medication Sig Dispense Refill    acetaminophen (TYLENOL) 500 MG Tab Take 2 Tablets by mouth one time as needed for Mild Pain or Moderate Pain. 30 Tablet 0    ibuprofen (MOTRIN) 800 MG Tab Take 1 Tablet by mouth one time as needed for Mild Pain or Moderate Pain. 30 Tablet 1       Nika Carroll D.O.               [1]   Past Medical History:  Diagnosis Date    Allergy     Anxiety 02/26/2021    ASTHMA     exercised induced    Recurrent epigastric abdominal pain 02/26/2021

## 2025-06-08 NOTE — PROGRESS NOTES
Assessment done.given tylenol for lpain at 4.Fundus firm.Lochia light. Fob at bedside,supportive.Pt. Caring for baby with good skill. Breastfeeding going well.assisted with latch.

## 2025-06-08 NOTE — DISCHARGE PLANNING
Discharge Planning Assessment Post Partum    LSW reviewed record and spoke with parents at the bedside.     Reason for Referral: Hx PPD  Address: [facility] [place] [address]  Type of Living Situation: Stable   Mom Diagnosis: Postpartum- Vag  Baby Diagnosis: - 39.2  Primary Language: English     Name of Baby: Cece Nugent   Father of the Baby: Joao Nugent  Involved in baby’s care? Yes   Contact Information: [phone]    Prenatal Care: Routine care   Mom's PCP: Dr. Last  PCP for new baby: Dr. Velarde     Support System: FOB  Coping/Bonding between mother & baby: Mother holding during encounter    Source of Feeding: Breast   Supplies for Infant: Parents are prepared     Mom's Insurance: [employer]   Baby Covered on Insurance: Parents plan to add baby to FOB's insurance ([employer]). Discussed adding baby within 30 days  Mother Employed/School: [employer]   Other children in the home/names & ages: 8yo son    Financial Hardship/Income: None   Mom's Mental status: Alert and oriented   Services used prior to admit: None    CPS History: None  Psychiatric History: Hx of PPD after first child. Mother states she is feeling well thus far. Resources provided.   Domestic Violence History: None   Drug/ETOH History: None     Resources Provided: PPD/ PPA resources and fact sheet   Referrals Made: None needed     Clearance for Discharge: Baby is cleared to discharge home with parents once medically ready.

## 2025-06-08 NOTE — PROGRESS NOTES
1855: Bedside report received from Diane SEVILLA. POC discussed. VSS. Lochia light. Care assumed at this time.     2025: Patient ambulated to restroom without difficulty. Gait steady. Void unsuccessful. Samra care performed. Pad and underwear applied. Fundus palpated to be midline. Patient educated that void must be performed within 6 hours.     2050: Bedside report given to Maria E SEVILLA. POC discussed. ID bands verified. Care relinquished at this time.

## 2025-06-08 NOTE — ANESTHESIA TIME REPORT
Anesthesia Start and Stop Event Times       Date Time Event    6/7/2025 1428 Ready for Procedure     1432 Anesthesia Start     1725 Anesthesia Stop          Responsible Staff  06/07/25      Name Role Begin End    Gloria Copeland M.D. Anesth 1432 1725          Overtime Reason:  no overtime (within assigned shift)    Comments:

## 2025-06-08 NOTE — LACTATION NOTE
This note was copied from a baby's chart.  Initial Consult:     History:   CAESAR, Gm, is a 30 y/o  s/p  at 39+2 wks on 2025 at 1725. Marginal cord, oligohydramnios, elevated 1h GTT with fingerstick surveillance mostly WNL.     Baby girl, Cece, had BW 2900 g (6 lbs, 6.3 oz), AGA.     History of BF: Multip. MOB  her 1st baby (now 8 y/o) for 5-6 wks. Had difficulties with nipple pain. Stopped d/t low supply.     Report of Current Breastfeeding Status:  MOB reports this baby  in the torres hour and has been feeding q2-3 h for 15-25 mins since.     Breasts wide-spaced/dense/symmetrical, nipples everted. Right nipple with sore base and tip, appears mildly reddened. Nipples short-shanked, pliable on left, more firm on the right. Denies PCOS and h/o infertility.  Denies h/o breast surgeries.    Baby girl is swaddled x2 and asleep in her mother's arms at time of visit. Good color and tone. Voiding/stooling appropriate to DOL.     MOB does not yet have a breast pump for home use, but is planning to order one from her insurance.     Breastfeeding Assistance:      Provided breastfeeding education on: skin to skin, supply and demand, hunger cues, frequency/duration of breastfeeds, cluster feeding, shallow vs deep latch.    Discussed sore nipple care: Express colostrum to nipple and spread over nipple, allow to air dry. Follow with nipple cream for dryness/crustiness. Nipple cream at bedside.      Answered MOB's questions about when to start pumping. Encouraged to wait until baby is 4 wks old if breastfeeding is going well.      [facility] Breastfeeding Resources handout provided and outpatient lactation care and support [place] options reviewed.     Plan to return at next feeding for latch assist/assessment.     1020:    Latch assist/assessment. L9 at right breast in football hold. Taught tummy-to-tummy, nose to nipple, hand express and tease to wide gape. Able to achieve comfortable latch on  right breast. Audible swallows, sustained nutritive pattern.    Reviewed signs of adequate milk intake, including adequate voids/stools, transition of stool to yellow/seedy around 5DOL, audible swallowing at breast, milk in mouth on release, progressive relaxation/satiation at breast during feeds, appropriate wt gain. Encouraged to keep peds appointments.    Manual pump ordered in case MOB needs to rest right nipple after discharge.     Encouraged to f/u with outpatient lactation if nipple pain continues to be a problem.     Encouraged to watch latch and sore nipples videos on [employer].    PLAN:    Skin to skin when either parent awake and alert.    Offer breast whenever hunger cues noted.    If baby sleeps more than 3 hours, wake baby and offer breast.    If baby not waking for feeding at least every 3 hours, hand express and spoon/cup feed back EBM to baby.

## 2025-06-08 NOTE — ANESTHESIA POSTPROCEDURE EVALUATION
Patient: Gm Riley    Procedure Summary       Date: 06/07/25 Room / Location:     Anesthesia Start: 1432 Anesthesia Stop: 1725    Procedure: Labor Epidural Diagnosis:     Scheduled Providers:  Responsible Provider: Gloria Copeland M.D.    Anesthesia Type: epidural ASA Status: 2            Final Anesthesia Type: epidural  Last vitals  BP   Blood Pressure: 111/57    Temp   36.3 °C (97.3 °F)    Pulse   82   Resp   16    SpO2   96 %      Anesthesia Post Evaluation    Patient location during evaluation: floor  Patient participation: complete - patient participated  Level of consciousness: awake and alert  Pain score: 0    Airway patency: patent  Anesthetic complications: no  Cardiovascular status: hemodynamically stable  Respiratory status: acceptable  Hydration status: euvolemic    PONV: none    patient able to participate, but full recovery from regional anesthesia has not occurred and is not expected within the stipulated timeframe for the completion of the evaluation    No notable events documented.     Nurse Pain Score: 0 (NPRS)

## 2025-06-08 NOTE — L&D DELIVERY NOTE
Vaginal Delivery Procedure Note:    Gm Riley is a 29 y.o. , now Para 2 admitted for IOL.  Her labor course progressed after receiving pitocin and SROM occurred after epidural placement.     PreDelivery Diagnosis:  1. SIUP @ 39w2d    PostDelivery Diagnosis:  1. S/p       Procedure in Detail:  Patient began pushing in the dorsal lithotomy position.  The head delivered easily over an intact perineum in the vertex position.  The anterior shoulder followed easily with gentle downwards pressure followed by the posterior shoulder and body.  There was no nuchal cord.  Infant was placed on the maternal abdomen and was stimulated and bulb suctioned.  Cord clamping was delayed x60 seconds then the cord was clamped x2 and cut.  Infant APGARs 8 and 9 and 1 and 5 minutes, respectively.  Placenta delivered spontaneously intact with 3 vessel cord.  The vagina and perineum were examined revealing a periurethral laceration which was repaired in standard fashion with 3-0 chromic with resulting hemostasis noted.  The uterus was firm with IV pitocin and fundal massage.  Patient and infant left bonding in LDR.    EBL : 150 cc  Anesthesia - epidural  Sponge and needle counts correct.  Patient tolerated procedure well.    Anticipate routine postparum care.    Nika Carroll D.O.  [facility]    Primary OB: Dr. Torre

## 2025-06-08 NOTE — DISCHARGE INSTRUCTIONS
Breastfeeding Plan:       Breastfeed baby on demand based on early feeding cues at least 8x in 24hrs. It is not recommended to let baby sleep longer than 4 hours between feedings and if sleepy, place skin to skin to promote feeding interest and milk production.  Baby's usually feed more frequently and longer when skin to skin with mother. Continue to hand express prior to latch.       Expect cluster feeding as this is normal during early days of life and growth spurts. Baby may feed more frequently and for longer periods of time.  Its normal for a baby to want to feed up to 18x in24 hr period. This frequent feeding is activating hormones to make mature breastmilk.  Breastmilk should increase in volume by postpartum day 4.  Signs and symptoms of engorgement might occur around that time.     It is not recommended to use pacifiers or supplementing with formula until breast feeding and milk production is well established or at least 4 weeks.    Make sure infant is getting enough by ensuring frequent feedings as well as looking for transitioning stools from dark meconium to bright yellow/green seedy loose stools by day 5.     Follow up with PEDS PCP as scheduled for weight checks and to make sure feeding is progressing appropriately.    Engorgement care: frequent breastfeeding, gentle (like petting a cat) massage towards axilla, cool compresses, hand express to comfort and to soften nipple area for latch, NSAIDs (like ibuprofen) as prescribed by OB for postpartum pain relief. Contact your OB provider if you develop a hard, warm, reddened lump especially if you also have a fever, chills, aches as this is concerning for mastitis.      Create a comfortable and relaxing environment for breastfeeding, minimizing distractions and ensuring proper positioning for mom and baby. Hold baby skin to skin with mom or dad as much as possible when you are awake and alert, skin to skin promotes bonding and breastfeeding success.       Breastfeeding support is available!      [facility] holds a free Breastfeeding [place] every Thursday from 11am-12pm lead by a Lactation Consultant.  No appointment necessary.  Excludes holidays. Bring your baby!  Location: [facility] in [facility]  3rd floor conference room. PATIENT DISCHARGE EDUCATION INSTRUCTION SHEET    REASONS TO CALL YOUR OBSTETRICIAN  Persistent fever, shaking, chills (Temperature higher than 100.4) may indicate you have an infection  Heavy bleeding: soaking more than 1 pad per hour; Passing clots an egg-sized clot or bigger may mean you have an postpartum hemorrhage  Foul odor from vagina or bad smelling or discolored discharge or blood  Breast infection (Mastitis symptoms); breast pain, chills, fever, redness or red streaks, may feel flu like symptoms  Urinary pain, burning or frequency  Incision that is not healing, increased redness, swelling, tenderness or pain, or any pus from episiotomy or  site may mean you have an infection  Redness, swelling, warmth, or painful to touch in the calf area of your leg may mean you have a blood clot  Severe or intensified depression, thoughts or feelings of wanting to hurt yourself or someone else   Pain in chest, obstructed breathing or shortness of breath (trouble catching your breath) may mean you are having a postpartum complication. Call your provider immediately   Headache that does not get better, even after taking medicine, a bad headache with vision changes or pain in the upper right area of your belly may mean you have high blood pressure or post birth preeclampsia. Call your provider immediately    HAND WASHING  All family and friends should wash their hands:  Before and after holding the baby  Before feeding the baby  After using the restroom or changing the baby's diaper    WOUND CARE  Ask your physician for additional care instructions. In general:   Incision:  May shower and pat incision dry   Keep the incision  clean and dry  There should not be any opening or pus from the incision  Continue to walk at home 3 times a day   Do NOT lift anything heavier than your baby (over 10 pounds)  Encourage family to help participate in care of the  to allow rest and mom time to heal  Episiotomy/Laceration  May use zeus-spray bottle, witch hazel pads and dermaplast spray for comfort  Use zeus-spray bottle after urinating to cleanse perineal area  To prevent burning during urination spray zeus-water bottle on labial area   Pat perineal area dry until episiotomy/laceration is healed  Continue to use zeus-bottle until bleeding stops as needed  If have a 2nd degree laceration or greater, a Sitz bath can offer relief from soreness, burning, and inflammation   Sitz Bath   Sit in 6 inches of warm water and soak laceration as needed until the laceration heals    VAGINAL CARE AND BLEEDING  Nothing inside vagina for 6 weeks:   No sexual intercourse, tampons or douching  Bleeding may continue for 2-4 weeks. Amount and color may vary  Soaking 1 pad or more in an hour for several hours is considered heavy bleeding  Passing large egg sized blood clots can be concerning  If you feel like you have heavy bleeding or are having increasing amount of blood clots call your Obstetrician immediately  If you begin feeling faint upon standing, feeling sick to your stomach, have clammy skin, a really fast heartbeat, have chills, start feeling confused, dizzy, sleepy or weak, or feeling like you're going to faint call your Obstetrician immediately    HYPERTENSION   Preeclampsia or gestational hypertension are types of high blood pressure that only pregnant women can get. It is important for you to be aware of symptoms to seek early intervention and treatment. If you have any of these symptoms immediately call your Obstetrician    Vision changes or blurred vision   Severe headache or pain that is unrelieved with medication and will not go away  Persistent  "pain in upper abdomen or shoulder   Increased swelling of face, feet, or hands  Difficulty breathing or shortness of breath at rest  Urinating less than usual    URINATION AND BOWEL MOVEMENTS  Eating more fiber (bran cereal, fruits, and vegetables) and drinking plenty of fluids will help to avoid constipation  Urinary frequency and urgency after childbirth is normal  If you experience any urinary pain, burning or frequency call your provider    BIRTH CONTROL  It is possible to become pregnant at any time after delivery and while breastfeeding  Plan to discuss a method of birth control with your physician at your post delivery follow up visit    POSTPARTUM BLUES  During the first few days after birth, you may experience a sense of the \"blues\" which may include impatience, irritability or even crying. These feelings come and go quickly. However, as many as 1 in 10 women experience emotional symptoms known as postpartum depression.     POSTPARTUM DEPRESSION    May start as early as the second or third day after delivery or take several weeks or months to develop. Symptoms of \"blues\" are present, but are more intense: Crying spells; loss of appetite; feelings of hopelessness or loss of control; fear of touching the baby; over concern or no concern at all about the baby; little or no concern about your own appearance/caring for yourself; and/or inability to sleep or excessive sleeping. Contact your Obstetrician if you are experiencing any of these symptoms     PREVENTING SHAKEN BABY  If you are angry or stressed, PUT THE BABY IN THE CRIB, step away, take some deep breaths, and wait until you are calm to care for the baby. DO NOT SHAKE THE BABY. You are not alone, call a supporter for help.  [facility] 24/7 crisis call line (998-147-6500) or (1-747.473.1363)  You can also text them, text \"ANSWER\" (548075)  "

## 2025-06-09 NOTE — PROGRESS NOTES
Discharge instructions reviewed by patient.  screen, patient and baby f/u appts also reviewed by patient . Car seat present. Birth certificate done.  patient to  meds to bed.  Bands matched and security tag removed.Mom and baby ready for discharge home.